# Patient Record
Sex: FEMALE | Race: WHITE | NOT HISPANIC OR LATINO | Employment: FULL TIME | URBAN - METROPOLITAN AREA
[De-identification: names, ages, dates, MRNs, and addresses within clinical notes are randomized per-mention and may not be internally consistent; named-entity substitution may affect disease eponyms.]

---

## 2017-02-17 ENCOUNTER — ALLSCRIPTS OFFICE VISIT (OUTPATIENT)
Dept: OTHER | Facility: OTHER | Age: 47
End: 2017-02-17

## 2017-02-17 DIAGNOSIS — Z13.29 ENCOUNTER FOR SCREENING FOR OTHER SUSPECTED ENDOCRINE DISORDER: ICD-10-CM

## 2017-02-17 DIAGNOSIS — Z12.39 ENCOUNTER FOR OTHER SCREENING FOR MALIGNANT NEOPLASM OF BREAST: ICD-10-CM

## 2017-02-17 DIAGNOSIS — Z13.220 ENCOUNTER FOR SCREENING FOR LIPOID DISORDERS: ICD-10-CM

## 2017-02-17 DIAGNOSIS — Z13.1 ENCOUNTER FOR SCREENING FOR DIABETES MELLITUS: ICD-10-CM

## 2017-02-17 DIAGNOSIS — E55.9 VITAMIN D DEFICIENCY: ICD-10-CM

## 2017-02-17 DIAGNOSIS — Z13.0 ENCOUNTER FOR SCREENING FOR DISEASES OF THE BLOOD AND BLOOD-FORMING ORGANS AND CERTAIN DISORDERS INVOLVING THE IMMUNE MECHANISM: ICD-10-CM

## 2017-03-20 ENCOUNTER — GENERIC CONVERSION - ENCOUNTER (OUTPATIENT)
Dept: OTHER | Facility: OTHER | Age: 47
End: 2017-03-20

## 2017-06-22 ENCOUNTER — ALLSCRIPTS OFFICE VISIT (OUTPATIENT)
Dept: OTHER | Facility: OTHER | Age: 47
End: 2017-06-22

## 2017-07-10 ENCOUNTER — OFFICE VISIT (OUTPATIENT)
Dept: URGENT CARE | Facility: CLINIC | Age: 47
End: 2017-07-10
Payer: COMMERCIAL

## 2017-07-10 ENCOUNTER — APPOINTMENT (OUTPATIENT)
Dept: RADIOLOGY | Facility: CLINIC | Age: 47
End: 2017-07-10
Payer: COMMERCIAL

## 2017-07-10 DIAGNOSIS — M25.512 PAIN IN LEFT SHOULDER: ICD-10-CM

## 2017-07-10 PROCEDURE — 99213 OFFICE O/P EST LOW 20 MIN: CPT

## 2017-07-10 PROCEDURE — 73030 X-RAY EXAM OF SHOULDER: CPT

## 2017-07-12 ENCOUNTER — ALLSCRIPTS OFFICE VISIT (OUTPATIENT)
Dept: OTHER | Facility: OTHER | Age: 47
End: 2017-07-12

## 2018-01-10 NOTE — RESULT NOTES
Verified Results  CT PELVIS W CONTRAST [id number] 08:11AM Mariam Counts     Test Name Result Flag Reference   CT PELVIS W CONTRAST (Report)     CT PELVIS WITH IV CONTRAST     INDICATION: Sacral pain, no trauma history  COMPARISON: None  TECHNIQUE: CT examination of the pelvis was performed  Examination was performed utilizing techniques to minimize radiation dose, including the use of dose reduction software  Axial, sagittal and coronal reformatted images were submitted for    interpretation  100 ml of Omnipaque 350 was injected intravenously  Enteric contrast was administered  FINDINGS:     VISUALIZED KIDNEYS/URETERS: No significant abnormality identified in the partially imaged kidneys  REPRODUCTIVE ORGANS: Unremarkable for patient's age  URINARY BLADDER: Unremarkable  APPENDIX: A normal appendix was visualized  VISUALIZED BOWEL: Unremarkable  ABDOMINOPELVIC CAVITY: No ascites or free intraperitoneal air  No lymphadenopathy  VISUALIZED VESSELS: Unremarkable for patient's age  ABDOMINOPELVIC WALL/INGUINAL REGIONS: Unremarkable  OSSEOUS STRUCTURES: Within the bony pelvis there is a nonexpansile uniform sclerotic 11 x 4 mm focus seen within the left iliac wing as well as a tiny rounded sclerotic focus in the left sacral brittany  Both have features which favor a benign etiology    such as a bone island  If there is clinical suspicion for metastatic disease, a radionuclide bone scan could be considered to assess for metabolic activity  There is no evidence for pelvic fracture demonstrated  However, there is subtle sclerosis and    overlying productive bone formation seen along the right L5 lamina which could indicate an old healed fracture or incomplete chronic or developmental spondylolysis  The sacrum and coccyx appear otherwise within normal limits  IMPRESSION:     No acute abnormality identified  Sclerotic foci within the pelvis as discussed above   Please see comment  Possible old right L5 spondylolysis  Signed by:   Westley Holt MD   2/1/16   100       Plan  Metastatic cancer to pelvis    · * NM BONE SCAN 3 PHASE; SIDE:Bilateral; Status:Need Information - Financial  Authorization;  Requested for:[id number];

## 2018-01-13 VITALS
BODY MASS INDEX: 20.82 KG/M2 | RESPIRATION RATE: 18 BRPM | DIASTOLIC BLOOD PRESSURE: 62 MMHG | SYSTOLIC BLOOD PRESSURE: 102 MMHG | TEMPERATURE: 98.3 F | HEART RATE: 76 BPM | WEIGHT: 140.56 LBS | HEIGHT: 69 IN
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
## 2018-01-14 VITALS
SYSTOLIC BLOOD PRESSURE: 110 MMHG | OXYGEN SATURATION: 97 % | BODY MASS INDEX: 20.08 KG/M2 | DIASTOLIC BLOOD PRESSURE: 68 MMHG | HEIGHT: 69 IN | TEMPERATURE: 96.9 F | WEIGHT: 135.56 LBS | RESPIRATION RATE: 16 BRPM | HEART RATE: 70 BPM

## 2018-01-15 ENCOUNTER — ALLSCRIPTS OFFICE VISIT (OUTPATIENT)
Dept: OTHER | Facility: OTHER | Age: 48
End: 2018-01-15

## 2018-01-15 DIAGNOSIS — Z13.0 ENCOUNTER FOR SCREENING FOR DISEASES OF THE BLOOD AND BLOOD-FORMING ORGANS AND CERTAIN DISORDERS INVOLVING THE IMMUNE MECHANISM: ICD-10-CM

## 2018-01-15 DIAGNOSIS — Z13.21 ENCOUNTER FOR SCREENING FOR NUTRITIONAL DISORDER: ICD-10-CM

## 2018-01-15 DIAGNOSIS — Z13.1 ENCOUNTER FOR SCREENING FOR DIABETES MELLITUS: ICD-10-CM

## 2018-01-15 DIAGNOSIS — Z13.29 ENCOUNTER FOR SCREENING FOR OTHER SUSPECTED ENDOCRINE DISORDER: ICD-10-CM

## 2018-01-15 DIAGNOSIS — Z13.220 ENCOUNTER FOR SCREENING FOR LIPOID DISORDERS: ICD-10-CM

## 2018-01-15 NOTE — RESULT NOTES
Verified Results  * XR SHOULDER 2+ VIEW LEFT [id number] 10:44AM Mildred Wilkinson Order Number: [id number]     Test Name Result Flag Reference   XR SHOULDER 2+ VW LEFT (Report)     LEFT SHOULDER     INDICATION: Left shoulder pain  Recent trauma  COMPARISON: None     VIEWS: AP, Grashey and transscapular lateral      IMAGES: 3     FINDINGS:     There is no acute fracture or dislocation  Bony cortex appears intact  No degenerative changes  No lytic or blastic lesions are seen  Dense calcification is noted in the soft tissues adjacent to the greater tuberosity of the proximal humerus  IMPRESSION:     No acute osseous abnormality  Evidence of calcific bursitis/tendinitis calcareous, left shoulder  Workstation performed: [id number]     Signed by:   Adrienne Adhikari MD   7/10/17
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
## 2018-01-22 VITALS
HEART RATE: 78 BPM | BODY MASS INDEX: 22.66 KG/M2 | HEIGHT: 69 IN | WEIGHT: 153 LBS | TEMPERATURE: 98.1 F | SYSTOLIC BLOOD PRESSURE: 108 MMHG | DIASTOLIC BLOOD PRESSURE: 62 MMHG | RESPIRATION RATE: 16 BRPM

## 2018-03-24 LAB
CHOLEST SERPL-MCNC: 168 MG/DL (ref 0–200)
HDLC SERPL-MCNC: 51 MG/DL (ref 40–60)
LDLC SERPL CALC-MCNC: 99.2 MG/DL (ref 75–193)
TRIGL SERPL-MCNC: 91 MG/DL (ref 44–166)
VLDL CHOLESTEROL (HISTORICAL): 18 MG/DL (ref 5–51)

## 2018-05-24 ENCOUNTER — OFFICE VISIT (OUTPATIENT)
Dept: INTERNAL MEDICINE CLINIC | Facility: CLINIC | Age: 48
End: 2018-05-24
Payer: COMMERCIAL

## 2018-05-24 VITALS
BODY MASS INDEX: 20.76 KG/M2 | HEART RATE: 60 BPM | WEIGHT: 140.2 LBS | RESPIRATION RATE: 16 BRPM | HEIGHT: 69 IN | SYSTOLIC BLOOD PRESSURE: 108 MMHG | TEMPERATURE: 97.5 F | DIASTOLIC BLOOD PRESSURE: 72 MMHG

## 2018-05-24 DIAGNOSIS — Z13.0 SCREENING FOR DEFICIENCY ANEMIA: ICD-10-CM

## 2018-05-24 DIAGNOSIS — G54.0 THORACIC OUTLET SYNDROME: ICD-10-CM

## 2018-05-24 DIAGNOSIS — Z13.29 SCREENING FOR HYPOTHYROIDISM: ICD-10-CM

## 2018-05-24 DIAGNOSIS — R07.82 INTERCOSTAL PAIN: Primary | ICD-10-CM

## 2018-05-24 DIAGNOSIS — Z13.220 SCREENING FOR HYPERLIPIDEMIA: ICD-10-CM

## 2018-05-24 DIAGNOSIS — Z13.1 SCREENING FOR DIABETES MELLITUS: ICD-10-CM

## 2018-05-24 LAB
ANION GAP SERPL CALCULATED.3IONS-SCNC: 13.1 MM/L
BUN SERPL-MCNC: 11 MG/DL (ref 7–25)
CALCIUM SERPL-MCNC: 9.5 MG/DL (ref 8.6–10.5)
CHLORIDE SERPL-SCNC: 100 MM/L (ref 98–107)
CO2 SERPL-SCNC: 28 MM/L (ref 21–31)
CREAT SERPL-MCNC: 0.75 MG/DL (ref 0.6–1.2)
EGFR (HISTORICAL): > 60 GFR
EGFR AFRICAN AMERICAN (HISTORICAL): > 60 GFR
GLUCOSE (HISTORICAL): 78 MG/DL (ref 65–99)
OSMOLALITY, SERUM (HISTORICAL): 272 MOSM (ref 262–291)
POTASSIUM SERPL-SCNC: 4.1 MM/L (ref 3.5–5.5)
SODIUM SERPL-SCNC: 137 MM/L (ref 134–143)

## 2018-05-24 PROCEDURE — 1036F TOBACCO NON-USER: CPT | Performed by: INTERNAL MEDICINE

## 2018-05-24 PROCEDURE — 3008F BODY MASS INDEX DOCD: CPT | Performed by: INTERNAL MEDICINE

## 2018-05-24 PROCEDURE — 99214 OFFICE O/P EST MOD 30 MIN: CPT | Performed by: INTERNAL MEDICINE

## 2018-05-24 RX ORDER — MULTIVITAMIN
TABLET ORAL DAILY
COMMUNITY

## 2018-05-24 RX ORDER — CYCLOBENZAPRINE HCL 10 MG
1 TABLET ORAL 3 TIMES DAILY PRN
COMMUNITY
Start: 2018-01-15 | End: 2018-05-24

## 2018-05-24 RX ORDER — DICLOFENAC SODIUM 75 MG/1
75 TABLET, DELAYED RELEASE ORAL 2 TIMES DAILY PRN
Qty: 60 TABLET | Refills: 1 | Status: SHIPPED | OUTPATIENT
Start: 2018-05-24 | End: 2018-07-16 | Stop reason: SDUPTHER

## 2018-05-24 RX ORDER — LANOLIN ALCOHOL/MO/W.PET/CERES
1 CREAM (GRAM) TOPICAL DAILY
COMMUNITY

## 2018-05-24 RX ORDER — DICLOFENAC SODIUM 75 MG/1
1 TABLET, DELAYED RELEASE ORAL 2 TIMES DAILY PRN
COMMUNITY
Start: 2018-01-15 | End: 2018-05-24

## 2018-05-24 RX ORDER — NORGESTIMATE AND ETHINYL ESTRADIOL 7DAYSX3 28
1 KIT ORAL
COMMUNITY
Start: 2018-01-15 | End: 2021-03-09

## 2018-05-24 NOTE — PROGRESS NOTES
Assessment/Plan:       Diagnoses and all orders for this visit:    Intercostal pain  -     Basic metabolic panel; Future    Thoracic outlet syndrome  -     CT chest w contrast; Future  -     diclofenac (VOLTAREN) 75 mg EC tablet; Take 1 tablet (75 mg total) by mouth 2 (two) times a day as needed (pain) for up to 30 days    Screening for deficiency anemia  -     CBC and differential    Screening for diabetes mellitus  -     Comprehensive metabolic panel    Screening for hyperlipidemia  -     Lipid Panel with Direct LDL reflex    Screening for hypothyroidism  -     TSH, 3rd generation with T4 reflex    Other orders  -     calcium citrate-vitamin D (CITRACAL+D) 315-200 MG-UNIT per tablet; Take by mouth  -     Discontinue: cyclobenzaprine (FLEXERIL) 10 mg tablet; Take 1 tablet by mouth 3 (three) times a day as needed  -     Discontinue: diclofenac (VOLTAREN) 75 mg EC tablet; Take 1 tablet by mouth 2 (two) times a day as needed  -     Multiple Vitamin tablet; Take by mouth daily  -     norgestimate-ethinyl estradiol (ORTHO TRI-CYCLEN,TRINESSA) 0 18/0 215/0 25 MG-35 MCG per tablet; Take 1 tablet by mouth        No problem-specific Assessment & Plan notes found for this encounter  We will followup on the CT of the chest and see if we can better define what is causing pain in her upper anterior chest wall  Subjective:      Patient ID: Shahab Del Castillo is a 53 y o  female  The patient is following up from her prior visit at which time the patient was noted to have issues with anterior chest pain below the right collar bone  She notes no injury to the area and states no injury to the area  The patient states that there are no other issues  She states that there are no other concerns and states the pain is only reproducible with reaching across her body with her right arm or flexing the arm above the level of her shoulder  She states the pain does not occur with cough, deep inspiration or bearing down    The patient states that she is able to run and it does not effect her  The patient has been experiencing this for almost 7 months  She has had chiropractic care that offers transient relief  She has used the Diclofenac that was prescribed in January with reasonable temporary effect  The following portions of the patient's history were reviewed and updated as appropriate:   She has no past medical history on file  ,   does not have any pertinent problems on file  ,   has a past surgical history that includes Dental surgery; Inguinal hernia repair; Arthrodesis; and Wrist surgery  ,  family history includes Diabetes in her mother; Heart disease in her father; Hypertension in her mother; Stroke in her father  ,   reports that she has never smoked  She has never used smokeless tobacco  She reports that she drinks alcohol  She reports that she does not use drugs  ,  has No Known Allergies     Current Outpatient Prescriptions   Medication Sig Dispense Refill    calcium citrate-vitamin D (CITRACAL+D) 315-200 MG-UNIT per tablet Take by mouth      Multiple Vitamin tablet Take by mouth daily      norgestimate-ethinyl estradiol (ORTHO TRI-CYCLEN,TRINESSA) 0 18/0 215/0 25 MG-35 MCG per tablet Take 1 tablet by mouth      diclofenac (VOLTAREN) 75 mg EC tablet Take 1 tablet (75 mg total) by mouth 2 (two) times a day as needed (pain) for up to 30 days 60 tablet 1     No current facility-administered medications for this visit  Review of Systems   Constitutional: Negative for chills, fatigue and fever  HENT: Negative for ear pain, postnasal drip, rhinorrhea, sinus pain and sinus pressure  Respiratory: Negative for cough, chest tightness and shortness of breath  Cardiovascular: Negative for chest pain and palpitations  Gastrointestinal: Negative for abdominal pain, constipation, diarrhea, nausea and vomiting  Genitourinary: Negative  Musculoskeletal: Negative for back pain and myalgias  Skin: Negative  Neurological: Negative  Psychiatric/Behavioral: Negative  Objective:  Vitals:    05/24/18 1150   BP: 108/72   BP Location: Left arm   Patient Position: Sitting   Cuff Size: Large   Pulse: 60   Resp: 16   Temp: 97 5 °F (36 4 °C)   Weight: 63 6 kg (140 lb 3 2 oz)   Height: 5' 9" (1 753 m)     Body mass index is 20 7 kg/m²  Physical Exam   Constitutional: She is oriented to person, place, and time  She appears well-developed and well-nourished  HENT:   Head: Normocephalic and atraumatic  Eyes: Conjunctivae and EOM are normal  Pupils are equal, round, and reactive to light  Neck: Normal range of motion  Neck supple  Cardiovascular: Normal rate, regular rhythm, normal heart sounds and intact distal pulses  Pulmonary/Chest: Effort normal and breath sounds normal    Abdominal: Soft  Musculoskeletal: She exhibits tenderness  Arms:  Neurological: She is alert and oriented to person, place, and time

## 2018-07-16 DIAGNOSIS — G54.0 THORACIC OUTLET SYNDROME: ICD-10-CM

## 2018-07-16 RX ORDER — DICLOFENAC SODIUM 75 MG/1
TABLET, DELAYED RELEASE ORAL
Qty: 60 TABLET | Refills: 1 | Status: SHIPPED | OUTPATIENT
Start: 2018-07-16 | End: 2021-03-09 | Stop reason: CLARIF

## 2019-03-20 ENCOUNTER — NURSE TRIAGE (OUTPATIENT)
Dept: PHYSICAL THERAPY | Facility: OTHER | Age: 49
End: 2019-03-20

## 2019-03-20 DIAGNOSIS — M54.50 ACUTE RIGHT-SIDED LOW BACK PAIN WITHOUT SCIATICA: Primary | ICD-10-CM

## 2019-03-20 NOTE — TELEPHONE ENCOUNTER
Background - Initial Assessment  Clinical complaint: Acute low back, right side, down her leg stopping at her knee  Date of onset: 2 months  Mechanism of injury: none    Previous Treatment - Previous Treatment  Previous evaluation: Orthopedic surgeon, pain mangement  Current provider: PCP  Diagnostics: None  Previous treatment: Physical therapy, epidural    Protocols used:  AMB COMPREHENSIVE SPINE PROGRAM PROTOCOL    Pt  c/o acute low back pain that is primarily on her right side that does travel to her leg,denies numbness or tingling  The pain does not travel to her arms at this time  Pt  did state that she has a long commute and at the end of that drive her back pain is worse  Pt  stated that she had two back surgeries for herniated discs in her back, and the last surgery was 6 years ago  Pt  does not f/u with a spine physician, since she has not had any further issues with her back  She has also had epidural injections in the past      Pt has no current imaging at this time  She did state that she is looking to do physical therapy since she has done this in the past, and is hoping this will elevates her low back pain  She is looking to avoid surgery  Pt  did refuse PM&R at this time and would like to have physical therapy at the [facility]  A referral was placed for physical therapy at the [facility], and Pt  was transferred to physical therapy , to  her evaluation appointment

## 2019-03-25 ENCOUNTER — EVALUATION (OUTPATIENT)
Dept: PHYSICAL THERAPY | Facility: CLINIC | Age: 49
End: 2019-03-25
Payer: COMMERCIAL

## 2019-03-25 VITALS — SYSTOLIC BLOOD PRESSURE: 110 MMHG | DIASTOLIC BLOOD PRESSURE: 78 MMHG | HEART RATE: 56 BPM | TEMPERATURE: 96.8 F

## 2019-03-25 DIAGNOSIS — M54.50 ACUTE RIGHT-SIDED LOW BACK PAIN WITHOUT SCIATICA: Primary | ICD-10-CM

## 2019-03-25 PROCEDURE — 97140 MANUAL THERAPY 1/> REGIONS: CPT | Performed by: PHYSICAL MEDICINE & REHABILITATION

## 2019-03-25 PROCEDURE — 97535 SELF CARE MNGMENT TRAINING: CPT | Performed by: PHYSICAL MEDICINE & REHABILITATION

## 2019-03-25 PROCEDURE — 97161 PT EVAL LOW COMPLEX 20 MIN: CPT | Performed by: PHYSICAL MEDICINE & REHABILITATION

## 2019-03-25 PROCEDURE — 97110 THERAPEUTIC EXERCISES: CPT | Performed by: PHYSICAL MEDICINE & REHABILITATION

## 2019-03-25 NOTE — PROGRESS NOTES
PT Evaluation     Today's date: 3/25/2019  Patient name: Disha Dominguez  : 1970  MRN: [id number]  Referring provider: Violette Chaparro MD  Dx:   Encounter Diagnosis     ICD-10-CM    1  Acute right-sided low back pain without sciatica M54 5                   Assessment  Assessment details: Disha Dominguez is a pleasant 53 y o  female who presents to [facility]PP[facility] through the [facility] with c/c of constant R sided LBP with intermittent pain into R LE to the knee  Reports her LBP and LE sx are worsened with prolonged sitting and repetitive bending activity  Notes her LBP is also aggravated by running and prolonged walking  PMHx is significant for similar past sx with resultant microdiscectomy and laminectomy procedures L/S in  and   She demonstrates very fearful and guarded AROM of her spine, which she notes she has limited since her surgeries for "fear of it going out again (her spine)"  The pt also demonstrates grossly poor posture with reduced postural awareness  She demonstrates + signs/sx suggestive of possible SIJ dysfunction and/or posterior derangement L/S  She responded fairly to initial tx this date  Extensive counseling and education was given regarding impacts of posture on the spine and spinal structures, proper postural alignment with verbal and hand on cues and demo, avoidance of prolonged or repetitive L/S flexion ROM/postures, use of extension in standing vs prone to neutralize and resolve effects of sustained flexion, and signs/sx of centralization vs peripheralization and when to stop or modify activities based on this  HEP was given for postural correction in sitting and use of prone vs standing L/S extension ROM frequently t/o the day as tolerated, pending sx response  Pt noted understanding without questions/concerns end of session   She will trial HEP over the next week and notify PT of her progress upcoming; she may be seen for one more visit/re-eval in 1-2 weeks to determine progress and need for further tx vs d/c  The patient's greatest concerns are the pain she is experiencing, wanting to avoid painkillers, wanting to avoid surgery, fear of not being able to keep active and future ill health (and wanting to prevent it)  No further referral appears necessary at this time based upon examination results  Primary movement impairment diagnosis of lumbar posterior derangement vs SIJ dysfunction resulting in pathoanatomical symptoms of Acute right-sided low back pain without sciatica  (primary encounter diagnosis) and limiting her ability to drive, exercise or recreation, perform household chores, sit and walk  Impairments include:  1) repeated flexion - addressing with mobs and repeated extension mobility exercises   2) poor lifting mechanics - addressing with functional retraining  3) poor lumbopelvic movement coordination - addressing with neuromotor retraining    Etiologic factors include repetitive poor body mechanics, repetitive poor lifting mechanics and poor ergonomics  Impairments: abnormal coordination, abnormal muscle firing, abnormal muscle tone, abnormal or restricted ROM, abnormal movement, activity intolerance, difficulty understanding, impaired physical strength, lacks appropriate home exercise program, pain with function, poor posture  and poor body mechanics    Symptom irritability: moderateUnderstanding of Dx/Px/POC: good   Prognosis: good  Prognosis details: Positive prognostic indicators include positive attitude toward recovery, good understanding of diagnosis and treatment plan options and absence of observed red flags  Negative prognostic indicators include pt apprehension regarding L/S ROM  Goals  STGs to be achieved in 2-4 weeks:    1  Pt will report reduced L/S pain levels "at worst" by at least 3-4 points (0-10 scale) in order to allow improved tolerance for functional mobility and reduced reliance on medication or modalities for pain relief     2  Pt will demonstrate improved AROM of flexion and extension grossly by at least 25% in order to reduce pt difficulty with functional mobility and transfers  3  Pt to report resolution of radicular sx into R LE  to demonstrate centralization of sx and overall progress with treatment  4  Pt will report overall improved tolerance for running with pt reporting ability to run at least 3 miles without increased pain/sx  5 Patient will be able to sit without limitation due to pain  LTGs to be achieved in 4-6 weeks:    1  Pt will be independent with HEP, demonstrating proper technique with exercises and understanding of self progression of program without need for cueing or assistance  2  Pt will report minimized pain levels with at least 75% reduction in pain since [facility]  3  Pt will demonstrate WFL AROM and strength of L/S grossly without increased pain  5  Pt will report return to running and weight training without limitations  6  FOTO will reflect score at discharge that is greater than or equal to predicted level  Plan  Patient would benefit from: skilled physical therapy  Planned therapy interventions: abdominal trunk stabilization, activity modification, joint mobilization, manual therapy, motor coordination training, neuromuscular re-education, patient education, self care, therapeutic activities, therapeutic exercise, home exercise program and behavior modification  Frequency: 1x week  Duration in visits: 2  Duration in weeks: 4  Treatment plan discussed with: patient        Subjective Evaluation    History of Present Illness  Mechanism of injury: Pt reports PMHx significant for two L/S surgeries; 12/2001 underwent microdisectomy and laminectomy at L4/L5 and L5/S1; and in 2013 underwent the same procedure again at L5/S1 per pt  Notes her sx completely resolved after each surgery, although she has been left with constant numbness R lateral border of her foot only   Notes she was also initially had R calf weakness which she reports has resolved  Pt notes present sx began about 2 months ago  Was active with running and training for a half marathon  Was sitting with legs crossed with laptop on her knees at work; noted upon getting up, she had elevated R sided LBP that radiated into her R posterior thigh to her knee  Notes she was busy with house work the next day, bending and cleaning; also ran 10miles; notes sx persisted  Notes she took a few days off from running and then reduced her mileage; Noted minimal changes in sx  Sx gradually improved once she "quit running" end of Feb after the half marathon  Noted pain across her L/S was worse when first getting OOB; gradually reduced with activity t/o the morning  However, Since she has stopped running, this has not occurred  Notes she began to see the chiropractor about a month ago; tx with US, massage, and stretching with some relief per pt  Works full time as a teacher; has an hour drive each way  Sx are worse upon getting out of the car  Gradually has begun walking 3-4 miles 2-3x/week  Pain is worse about 2-3 miles in with R sided LBP, however improves if she keeps going  Pt has not seen her MD in the past year  Feels her sx are similar to what she had in the past however not as severe  Notes she also got a new mattress Saturday; notes minimal sx today and that today is a "good day" for her  At this point, pt notes sx are random  No reduction in pain with ice, aleve, or advil  R sided LBP is constant whereas R LE sx are intermittent and only to the knee  Notes with sitting ~1 hr she gets R sided LBP and pain into R buttocks and posterior thigh; feels LE sx seem to occur only with sitting  Constant n/t lateral R foot since 2nd surgery as noted; no change to date per pt  No other n/t  Denies any LE weakness, changes in gait, etc at this time  Notes she has stopped weight training and running; and would like to resume as soon as possible   Pt notes fear of bending and lifting since her surgeries  In general, avoids end ranges of spine movement, especially in combination  Recurrent probem    Pain  Current pain ratin  At best pain ratin  At worst pain ratin  Quality: dull ache, radiating and sharp  Aggravating factors: sitting, running and walking  Progression: improved    Social Support  Steps to enter house: yes  Stairs in house: no   L[facility] in: [facility]  Lives with: sister  Employment status: working (teacher)  Hand dominance: right  Exercise history: Used to enjoy running and strength training, presently fearful to resume       Diagnostic Tests  No diagnostic tests performed  Treatments  Current treatment: chiropractic and physical therapy  Patient Goals  Patient goals for therapy: decreased pain, increased motion, return to sport/leisure activities and independence with ADLs/IADLs  Patient's goals regarding treatment: "to not have any pain" and to "return to strength training and running"        Objective     Concurrent Complaints  Positive for disturbed sleep   Negative for night pain, bladder dysfunction, bowel dysfunction, saddle (S4) numbness and history of trauma    Neurological Testing     Sensation     Lumbar   Left   Intact: light touch    Right   Intact: light touch  Diminished: light touch    Reflexes   Left   Patellar (L4): normal (2+)  Achilles (S1): normal (2+)    Right   Patellar (L4): normal (2+)  Achilles (S1): normal (2+)    Additional Neurological Details  Demonstrates reduced sensation to light touch lateral border of R foot  No other sensory changes noted  Will monitor     Active Range of Motion     Lumbar   Flexion:  Restriction level: moderate  Extension:  with pain Restriction level: moderate  Left lateral flexion:  Restriction level: moderate  Right lateral flexion:  Restriction level: minimal  Left rotation:  Restriction level: moderate  Right rotation:  Restriction level: moderate    Passive Range of Motion     Lumbar   Left lateral flexion:  Restriction level: moderate  Right lateral flexion:  Restriction level: minimal  Left rotation:  Restriction level: moderate  Right rotation:  Restriction level: moderate  Mechanical Assessment    Cervical      Thoracic      Lumbar    Standing flexion: repeated movements   Pain location:no change  Pain level: increased  Pain increased, no worse  Standing extension: repeated movements  Pain location: no change  Pain level: increased  Pain increased, slightly worse (" 5/10" worse)  Lying extension: repeated movements  Pain location: no change  Pain intensity: better  Pain slightly reduced at rest following repeated L/S ext in prone (back to baseline, no change in location)      Strength/Myotome Testing     Lumbar   Left   Heel walk: normal  Toe walk: normal    Right   Heel walk: normal  Toe walk: normal    Left Hip   Planes of Motion   Flexion: 4  Abduction: 4  Adduction: 4+    Right Hip   Planes of Motion   Flexion: 4  Abduction: 4  Adduction: 4+    Left Knee   Flexion: 4+  Extension: 4+    Right Knee   Flexion: 4+  Extension: 4+    Left Ankle/Foot   Dorsiflexion: 4+  Plantar flexion: 5  Great toe flexion: 4+  Great toe extension: 4+    Right Ankle/Foot   Dorsiflexion: 4+  Plantar flexion: 5  Great toe flexion: 4+  Great toe extension: 4+    Additional Strength Details  No pain/sx with MMT screen      Tests     Lumbar   Positive sacroiliac distraction   Negative SIJ compression  Left   Negative crossed SLR and passive SLR  Right   Negative crossed SLR and passive SLR  Right Hip   Positive long sit       Additional Tests Details  + supine to sit test for R posterior/L anterior rotation; corrected following MET technique with negative test following correction; will monitor         General Comments:      Lumbar Comments  Pain returned to baseline following tx/assessment  No radicular sx produced  Improved AROM L/S extension noted end of tx  Will cont to assess           Precautions: Hx L/S laminectomy and microdiskectomy x 2       Re-eval Date: 4/22    Date 3/25       Visit Count 1       FOTO 3/25       Pain In 1/10       Pain Out 1/10           Daily Treatment Diary     Manual  3/25        8'                                       Muscle Energy Technique for right posterior SI rotation patient in hook lying to patient tolerance  Exercise Diary  3/25       Seated posture correction training with cues/demo 10' total       Repeated L/S ext in standing 2x10        Prone in extension 3'       Prone extension in lying 10x                                                                                                                                             Modalities  3/25                                 3/25 - HEP was issued and reviewed this date for above noted exercises  Extensive pt education was provided as noted in assessment section  Pt demonstrated understanding without incident and without questions/concerns  Will continue to update upcoming

## 2019-05-16 NOTE — PROGRESS NOTES
Terri will be discharged from outpatient physical therapy care due to expiration of plan of care  Last attended tx session was on 3/25 ;did not return to therapy after this date  No objective measures updated, Terri is not present at time of discharge

## 2021-03-09 ENCOUNTER — TELEPHONE (OUTPATIENT)
Dept: ADMINISTRATIVE | Facility: OTHER | Age: 51
End: 2021-03-09

## 2021-03-09 ENCOUNTER — OFFICE VISIT (OUTPATIENT)
Dept: INTERNAL MEDICINE CLINIC | Facility: CLINIC | Age: 51
End: 2021-03-09
Payer: COMMERCIAL

## 2021-03-09 VITALS
TEMPERATURE: 97.6 F | BODY MASS INDEX: 21.12 KG/M2 | WEIGHT: 142.6 LBS | RESPIRATION RATE: 14 BRPM | OXYGEN SATURATION: 92 % | DIASTOLIC BLOOD PRESSURE: 78 MMHG | SYSTOLIC BLOOD PRESSURE: 108 MMHG | HEIGHT: 69 IN | HEART RATE: 70 BPM

## 2021-03-09 DIAGNOSIS — Z12.11 SCREEN FOR COLON CANCER: Primary | ICD-10-CM

## 2021-03-09 DIAGNOSIS — Z13.220 SCREENING FOR HYPERCHOLESTEROLEMIA: ICD-10-CM

## 2021-03-09 PROCEDURE — 99396 PREV VISIT EST AGE 40-64: CPT | Performed by: INTERNAL MEDICINE

## 2021-03-09 NOTE — PROGRESS NOTES
Assessment/Plan:    Problem List Items Addressed This Visit     None      Visit Diagnoses     Screen for colon cancer    -  Primary    Relevant Orders    Ambulatory referral for colonoscopy    Screening for hypercholesterolemia        Relevant Orders    Lipid Panel with Direct LDL reflex           Diagnoses and all orders for this visit:    Screen for colon cancer  -     Ambulatory referral for colonoscopy; Future    Screening for hypercholesterolemia  -     Lipid Panel with Direct LDL reflex; Future    Other orders  -     Cancel: Ambulatory referral to Gastroenterology; Future        No problem-specific Assessment & Plan notes found for this encounter  Subjective:      Patient ID: Daisy Kerr is a 53 y o  female  Discussed Shingrix vaccine and we will do this when able  The patient needs a screening colonoscopy and we discussed this  The patient is interested in the Lipid screening      The following portions of the patient's history were reviewed and updated as appropriate:   She has no past medical history on file  ,  does not have any pertinent problems on file  ,   has a past surgical history that includes Dental surgery; Inguinal hernia repair; Arthrodesis; and Wrist surgery  ,  family history includes Diabetes in her mother; Heart disease in her father; Hypertension in her mother; Stroke in her father  ,   reports that she has never smoked  She has never used smokeless tobacco  She reports current alcohol use  She reports that she does not use drugs  ,  has No Known Allergies     Current Outpatient Medications   Medication Sig Dispense Refill    calcium citrate-vitamin D (CITRACAL+D) 315-200 MG-UNIT per tablet Take by mouth      Multiple Vitamin tablet Take by mouth daily       No current facility-administered medications for this visit  Review of Systems   Constitutional: Negative for chills, fatigue and fever  HENT: Negative      Respiratory: Negative for cough, chest tightness and shortness of breath  Cardiovascular: Negative for chest pain, palpitations and leg swelling  Gastrointestinal: Negative for abdominal pain, constipation, diarrhea, nausea and vomiting  Genitourinary: Negative  Musculoskeletal: Negative for arthralgias, back pain and myalgias  Skin: Negative  Neurological: Negative  Psychiatric/Behavioral: Negative  Objective:  Vitals:    03/09/21 0913   BP: 108/78   BP Location: Left arm   Patient Position: Sitting   Cuff Size: Standard   Pulse: 70   Resp: 14   Temp: 97 6 °F (36 4 °C)   SpO2: 92%   Weight: 64 7 kg (142 lb 9 6 oz)   Height: 5' 9" (1 753 m)     Body mass index is 21 06 kg/m²  Physical Exam  Vitals signs and nursing note reviewed  Constitutional:       Appearance: She is well-developed  HENT:      Head: Normocephalic and atraumatic  Eyes:      Pupils: Pupils are equal, round, and reactive to light  Neck:      Musculoskeletal: Normal range of motion and neck supple  Cardiovascular:      Rate and Rhythm: Normal rate and regular rhythm  Heart sounds: Normal heart sounds  No murmur  Pulmonary:      Effort: Pulmonary effort is normal  No respiratory distress  Breath sounds: Normal breath sounds  No wheezing  Abdominal:      General: Bowel sounds are normal       Palpations: Abdomen is soft  Tenderness: There is no abdominal tenderness  Musculoskeletal: Normal range of motion  Skin:     General: Skin is warm and dry  Neurological:      Mental Status: She is alert and oriented to person, place, and time            PHQ-9 Depression Screening    PHQ-9:   Frequency of the following problems over the past two weeks:      Little interest or pleasure in doing things: 0 - not at all  Feeling down, depressed, or hopeless: 0 - not at all  PHQ-2 Score: 0

## 2021-03-09 NOTE — TELEPHONE ENCOUNTER
----- Message from Holly Marie sent at 3/9/2021  7:29 AM EST -----  03/09/21 7:29 AM    Hello, our patient Terri Esteban has had Mammogram completed/performed  Please assist in updating the patient chart by pulling the Care Everywhere (CE) document  The date of service is 10/16/2020       Thank you,  Angy Suggs PG [facility]

## 2021-03-09 NOTE — TELEPHONE ENCOUNTER
----- Message from Fernando Johnson sent at 3/9/2021  7:30 AM EST -----  03/09/21 7:30 AM    Hello, our patient Terri Esteban has had Pap Smear (HPV) aka Cervical Cancer Screening completed/performed  Please assist in updating the patient chart by pulling the Care Everywhere (CE) document  The date of service is 2/26/2021       Thank you,  Angy Suggs PG [facility]

## 2021-03-09 NOTE — TELEPHONE ENCOUNTER
Upon review of the In Basket request we were able to locate, review, and update the patient chart as requested for Mammogram and Pap Smear (HPV) aka Cervical Cancer Screening  Any additional questions or concerns should be emailed to the Practice Liaisons via [email or web address] email, please do not reply via In Basket      Thank you  Jena Yu

## 2021-03-31 DIAGNOSIS — Z23 ENCOUNTER FOR IMMUNIZATION: ICD-10-CM

## 2021-05-15 ENCOUNTER — APPOINTMENT (OUTPATIENT)
Dept: LAB | Facility: HOSPITAL | Age: 51
End: 2021-05-15
Payer: COMMERCIAL

## 2021-05-15 DIAGNOSIS — Z13.220 SCREENING FOR HYPERCHOLESTEROLEMIA: ICD-10-CM

## 2021-05-15 LAB
CHOLEST SERPL-MCNC: 171 MG/DL (ref 0–200)
HDLC SERPL-MCNC: 49 MG/DL
LDLC SERPL CALC-MCNC: 106 MG/DL (ref 0–100)
TRIGL SERPL-MCNC: 80 MG/DL (ref 44–166)

## 2021-05-15 PROCEDURE — 36415 COLL VENOUS BLD VENIPUNCTURE: CPT

## 2021-05-15 PROCEDURE — 80061 LIPID PANEL: CPT

## 2022-01-04 ENCOUNTER — OFFICE VISIT (OUTPATIENT)
Dept: PHYSICAL THERAPY | Facility: MEDICAL CENTER | Age: 52
End: 2022-01-04
Payer: COMMERCIAL

## 2022-01-04 DIAGNOSIS — G50.1 ATYPICAL FACIAL PAIN: ICD-10-CM

## 2022-01-04 DIAGNOSIS — R51.9 CRANIOFACIAL PAIN: ICD-10-CM

## 2022-01-04 DIAGNOSIS — G51.8 CRANIOFACIAL PAIN SYNDROME: Primary | ICD-10-CM

## 2022-01-04 PROCEDURE — 97162 PT EVAL MOD COMPLEX 30 MIN: CPT | Performed by: PHYSICAL THERAPIST

## 2022-01-04 PROCEDURE — 97112 NEUROMUSCULAR REEDUCATION: CPT | Performed by: PHYSICAL THERAPIST

## 2022-01-04 NOTE — PROGRESS NOTES
PT Evaluation  and PT Discharge    Today's date: 2022  Patient name: Susie Espana  : 1970  MRN: [id number]  Referring provider: Stella Prajapati PT  Dx:   Encounter Diagnosis     ICD-10-CM    1  Craniofacial pain syndrome  G51 8    2  Craniofacial pain  R51 9    3  Atypical facial pain  G50 1                   Assessment  Assessment details: Terri Esteban is a pleasant 49 y o  female who presents with R craniofacial pain that began 1 week prior to Thanksgiving and did not improve despite NSAIDs and corticosteroids until the last 2 weeks  The primary movement problem is poor TMJ movement coordination resulting in R anterior disc displacement with reduction and limiting her ability to chew, eat and yawn  Patient would benefit from further consultation for possible night splint due to significant signs of nocturnal bruxism such as cusp wear and scalloped tongue  I expect she will continue to improve and will improve faster with addition of a few exercises to facilitate healing  Furthermore, with the addition of a few other exercises, she will have the tools to address any future recurrences as needed  The patient's greatest concerns are that this problem will become recurrent  Problem List:  1) poor TMJ movement coordination - addressing with neuromotor retraining   2) poor cervicothoracic movement coordination - addressing with functional retraining  3) poor postural control - addressing with neuromotor retraining     Etiology is likely a combination of biting hard on a food object in combination of stressors and nocturnal bruxism  Prognosis details: Positive prognostic indicators include positive attitude toward recovery  Goals  Patient will be independent with home exercise program  - MET  Patient will be able to manage symptoms independently   - MET    Plan  Duration in visits: 1        Subjective Evaluation    History of Present Illness  Mechanism of injury: Insidious about 1 week prior to Thanksgiving while eating but did not go away despite trial of NSAIDs and also corticosteroids  She also tried ice and self-massage with no benefit  Eating and opening her mouth was extremely painful and felt considerable crepitus and clicking along with the pain  Over the past 1-2 weeks, it has improved considerably  Pain  At worst pain rating: 10    Patient Goals  Patient goals for therapy: decreased pain, increased motion and independence with ADLs/IADLs          Objective     Static Posture     Head  Forward  Shoulders  Rounded  Postural Observations  Seated posture: poor  Standing posture: fair        Palpation   Left   Hypertonic in the scalenes, sternocleidomastoid, upper trapezius, masseter and lateral pterygoid  Tenderness of the lateral pterygoid  Right   Hypertonic in the scalenes, sternocleidomastoid, upper trapezius, masseter and lateral pterygoid       Neurological Testing     Sensation   Cervical/Thoracic   Left   Intact: light touch    Right   Intact: light touch    Reflexes   Left   Biceps (C5/C6): normal (2+)  Elizabeth's reflex: negative    Right   Biceps (C5/C6): normal (2+)  Elizabeth's reflex: negative    Active Range of Motion   Cervical/Thoracic Spine       Cervical    Flexion:  WFL  Extension:  Restriction level: minimal  Left lateral flexion:  WFL  Right lateral flexion:  Restriction level moderate  Left rotation:  WFL  Right rotation:  [facility]  Left Shoulder   Normal active range of motion    Right Shoulder   Normal active range of motion    Left Elbow   Normal active range of motion    Right Elbow   Normal active range of motion    Left Wrist   Normal active range of motion    Right Wrist   Normal active range of motion    Joint Play   Joints within functional limits: C1, C2, C3 and C4     Hypomobile: C5, C6, C7 and T1     Strength/Myotome Testing   Cervical Spine     Left   Normal strength    Right   Normal strength    Tests   Cervical   Positive craniocervical flexion test     Left   Negative Spurling's Test A  Right   Negative Spurling's Test A  Ambulation   Weight-Bearing Status   Weight-Bearing Status (Left): full weight bearing   Weight-Bearing Status (Right): full weight-bearing      Observational Gait   Gait: within functional limits   TMJ   Jaw observations: facial symmetry within normal limits  Occlusion class: class I (normal)  Patient does not have a  cleft palate  Scalloping of tongue: yes  Cusp wear: yes  Jaw trauma: no  Retrognathia: yes  Joint sounds left: normal  Joint sounds right: popping  Lateral bite test, Left: pain on right  Lateral bite test, right: no pain  ROM: pain with movement  Opening (mm): 40 and right deviation   Lateral excursion, left (mm): 7  Lateral excursion, right (mm)t: 8              Precautions: none    Date: 1/4      Manual                                          Neuromuscular Re-education       TMJ controlled opening SK      TMJ lateral excursion SK             TMJ relaxed position SK      Cervical retraction SK      Yawn blocking SK                    Therapeutic Exercise                                   Therapeutic Activities                     Gait Training                     Modalities                     Access Code: [email or web address]  URL: [email or web address]

## 2022-03-11 ENCOUNTER — TELEPHONE (OUTPATIENT)
Dept: ADMINISTRATIVE | Facility: OTHER | Age: 52
End: 2022-03-11

## 2022-03-11 ENCOUNTER — OFFICE VISIT (OUTPATIENT)
Dept: INTERNAL MEDICINE CLINIC | Facility: CLINIC | Age: 52
End: 2022-03-11
Payer: COMMERCIAL

## 2022-03-11 VITALS
BODY MASS INDEX: 20.65 KG/M2 | RESPIRATION RATE: 12 BRPM | SYSTOLIC BLOOD PRESSURE: 116 MMHG | DIASTOLIC BLOOD PRESSURE: 74 MMHG | TEMPERATURE: 97.5 F | OXYGEN SATURATION: 100 % | WEIGHT: 139.4 LBS | HEIGHT: 69 IN | HEART RATE: 67 BPM

## 2022-03-11 DIAGNOSIS — Z13.0 SCREENING, ANEMIA, DEFICIENCY, IRON: ICD-10-CM

## 2022-03-11 DIAGNOSIS — Z82.49 FAMILY HISTORY OF EARLY CAD: ICD-10-CM

## 2022-03-11 DIAGNOSIS — E78.00 HYPERCHOLESTEROLEMIA: ICD-10-CM

## 2022-03-11 DIAGNOSIS — Z13.1 SCREENING FOR DIABETES MELLITUS: Primary | ICD-10-CM

## 2022-03-11 DIAGNOSIS — Z13.220 SCREENING FOR HYPERCHOLESTEROLEMIA: ICD-10-CM

## 2022-03-11 PROCEDURE — 3725F SCREEN DEPRESSION PERFORMED: CPT | Performed by: INTERNAL MEDICINE

## 2022-03-11 PROCEDURE — 3008F BODY MASS INDEX DOCD: CPT | Performed by: INTERNAL MEDICINE

## 2022-03-11 PROCEDURE — 99214 OFFICE O/P EST MOD 30 MIN: CPT | Performed by: INTERNAL MEDICINE

## 2022-03-11 PROCEDURE — 1036F TOBACCO NON-USER: CPT | Performed by: INTERNAL MEDICINE

## 2022-03-11 NOTE — PROGRESS NOTES
Assessment/Plan:    Problem List Items Addressed This Visit     None      Visit Diagnoses     Screening for diabetes mellitus    -  Primary    Relevant Orders    Comprehensive metabolic panel    Screening for hypercholesterolemia        Relevant Orders    Lipid Panel with Direct LDL reflex    Screening, anemia, deficiency, iron        Relevant Orders    CBC and differential    Family history of early CAD        Relevant Orders    CT coronary calcium score    Hypercholesterolemia        Relevant Orders    CT coronary calcium score           Diagnoses and all orders for this visit:    Screening for diabetes mellitus  -     Comprehensive metabolic panel; Future    Screening for hypercholesterolemia  -     Lipid Panel with Direct LDL reflex; Future    Screening, anemia, deficiency, iron  -     CBC and differential; Future    Family history of early CAD  -     CT coronary calcium score; Future    Hypercholesterolemia  -     CT coronary calcium score; Future        No problem-specific Assessment & Plan notes found for this encounter  Subjective:      Patient ID: Max Hastings is a 49 y o  female  The patient was seen and examined and noted to have issues with elevated cholesterol on prior labs and notes a family history of CAD  The patient is interested in further risk assessment of personal CAD risk  There are no other issues  We discussed a low cholesterol diet  The following portions of the patient's history were reviewed and updated as appropriate:   She has no past medical history on file  ,  does not have any pertinent problems on file  ,   has a past surgical history that includes Dental surgery; Inguinal hernia repair; Arthrodesis; and Wrist surgery  ,  family history includes Diabetes in her mother; Heart disease in her father; Hypertension in her mother; Stroke in her father  ,   reports that she has never smoked  She has never used smokeless tobacco  She reports current alcohol use   She reports that she does not use drugs  ,  has No Known Allergies     Current Outpatient Medications   Medication Sig Dispense Refill    calcium citrate-vitamin D (CITRACAL+D) 315-200 MG-UNIT per tablet Take by mouth      Multiple Vitamin tablet Take by mouth daily       No current facility-administered medications for this visit  Review of Systems   Constitutional: Negative for chills, fatigue and fever  HENT: Negative  Respiratory: Negative for cough, chest tightness and shortness of breath  Cardiovascular: Negative for chest pain, palpitations and leg swelling  Gastrointestinal: Negative for abdominal pain, constipation, diarrhea, nausea and vomiting  Genitourinary: Negative  Musculoskeletal: Negative for arthralgias, back pain and myalgias  Skin: Negative  Neurological: Negative  Psychiatric/Behavioral: Negative  Objective:  Vitals:    03/11/22 0701   BP: 116/74   BP Location: Left arm   Patient Position: Sitting   Cuff Size: Standard   Pulse: 67   Resp: 12   Temp: 97 5 °F (36 4 °C)   SpO2: 100%   Weight: 63 2 kg (139 lb 6 4 oz)   Height: 5' 9" (1 753 m)     Body mass index is 20 59 kg/m²  Physical Exam  Vitals and nursing note reviewed  Constitutional:       Appearance: She is well-developed  HENT:      Head: Normocephalic and atraumatic  Eyes:      Pupils: Pupils are equal, round, and reactive to light  Cardiovascular:      Rate and Rhythm: Normal rate and regular rhythm  Heart sounds: Normal heart sounds  No murmur heard  Pulmonary:      Effort: Pulmonary effort is normal  No respiratory distress  Breath sounds: Normal breath sounds  No wheezing  Abdominal:      General: Bowel sounds are normal       Palpations: Abdomen is soft  Tenderness: There is no abdominal tenderness  Musculoskeletal:         General: Normal range of motion  Cervical back: Normal range of motion and neck supple  Skin:     General: Skin is warm and dry     Neurological: Mental Status: She is alert and oriented to person, place, and time            PHQ-2/9 Depression Screening    Little interest or pleasure in doing things: 0 - not at all  Feeling down, depressed, or hopeless: 0 - not at all  PHQ-2 Score: 0  PHQ-2 Interpretation: Negative depression screen

## 2022-03-11 NOTE — TELEPHONE ENCOUNTER
----- Message from Jocelyn Swift sent at 3/10/2022  8:20 AM EST -----  03/10/22 8:20 AM    Hello, our patient Terri Estbean has had Mammogram completed/performed  Please assist in updating the patient chart by pulling the Care Everywhere (CE) document  The date of service is 12/8/2021       Thank you,  Angy Suggs  [facility]

## 2022-05-13 ENCOUNTER — HOSPITAL ENCOUNTER (OUTPATIENT)
Dept: CT IMAGING | Facility: CLINIC | Age: 52
Discharge: HOME | End: 2022-05-13
Payer: COMMERCIAL

## 2022-05-13 DIAGNOSIS — E78.00 HYPERCHOLESTEROLEMIA: ICD-10-CM

## 2022-05-13 DIAGNOSIS — Z82.49 FAMILY HISTORY OF EARLY CAD: ICD-10-CM

## 2022-05-13 PROCEDURE — 75571 CT HRT W/O DYE W/CA TEST: CPT

## 2022-05-13 PROCEDURE — G1004 CDSM NDSC: HCPCS

## 2022-05-27 ENCOUNTER — APPOINTMENT (OUTPATIENT)
Dept: LAB | Facility: CLINIC | Age: 52
End: 2022-05-27
Payer: COMMERCIAL

## 2022-05-27 DIAGNOSIS — Z13.220 SCREENING FOR HYPERCHOLESTEROLEMIA: ICD-10-CM

## 2022-05-27 DIAGNOSIS — Z13.0 SCREENING, ANEMIA, DEFICIENCY, IRON: ICD-10-CM

## 2022-05-27 DIAGNOSIS — Z13.1 SCREENING FOR DIABETES MELLITUS: ICD-10-CM

## 2022-05-27 LAB
ALBUMIN SERPL BCP-MCNC: 3.8 G/DL (ref 3.5–5)
ALP SERPL-CCNC: 42 U/L (ref 46–116)
ALT SERPL W P-5'-P-CCNC: 17 U/L (ref 12–78)
ANION GAP SERPL CALCULATED.3IONS-SCNC: 2 MMOL/L (ref 4–13)
AST SERPL W P-5'-P-CCNC: 13 U/L (ref 5–45)
BASOPHILS # BLD AUTO: 0.02 THOUSANDS/ΜL (ref 0–0.1)
BASOPHILS NFR BLD AUTO: 0 % (ref 0–1)
BILIRUB SERPL-MCNC: 0.57 MG/DL (ref 0.2–1)
BUN SERPL-MCNC: 10 MG/DL (ref 5–25)
CALCIUM SERPL-MCNC: 9.5 MG/DL (ref 8.3–10.1)
CHLORIDE SERPL-SCNC: 107 MMOL/L (ref 100–108)
CHOLEST SERPL-MCNC: 175 MG/DL
CO2 SERPL-SCNC: 30 MMOL/L (ref 21–32)
CREAT SERPL-MCNC: 0.65 MG/DL (ref 0.6–1.3)
EOSINOPHIL # BLD AUTO: 0.05 THOUSAND/ΜL (ref 0–0.61)
EOSINOPHIL NFR BLD AUTO: 1 % (ref 0–6)
ERYTHROCYTE [DISTWIDTH] IN BLOOD BY AUTOMATED COUNT: 13.2 % (ref 11.6–15.1)
GFR SERPL CREATININE-BSD FRML MDRD: 103 ML/MIN/1.73SQ M
GLUCOSE P FAST SERPL-MCNC: 84 MG/DL (ref 65–99)
HCT VFR BLD AUTO: 40.9 % (ref 34.8–46.1)
HDLC SERPL-MCNC: 54 MG/DL
HGB BLD-MCNC: 13 G/DL (ref 11.5–15.4)
IMM GRANULOCYTES # BLD AUTO: 0 THOUSAND/UL (ref 0–0.2)
IMM GRANULOCYTES NFR BLD AUTO: 0 % (ref 0–2)
LDLC SERPL CALC-MCNC: 110 MG/DL (ref 0–100)
LYMPHOCYTES # BLD AUTO: 1.12 THOUSANDS/ΜL (ref 0.6–4.47)
LYMPHOCYTES NFR BLD AUTO: 25 % (ref 14–44)
MCH RBC QN AUTO: 29.8 PG (ref 26.8–34.3)
MCHC RBC AUTO-ENTMCNC: 31.8 G/DL (ref 31.4–37.4)
MCV RBC AUTO: 94 FL (ref 82–98)
MONOCYTES # BLD AUTO: 0.44 THOUSAND/ΜL (ref 0.17–1.22)
MONOCYTES NFR BLD AUTO: 10 % (ref 4–12)
NEUTROPHILS # BLD AUTO: 2.94 THOUSANDS/ΜL (ref 1.85–7.62)
NEUTS SEG NFR BLD AUTO: 64 % (ref 43–75)
NRBC BLD AUTO-RTO: 0 /100 WBCS
PLATELET # BLD AUTO: 257 THOUSANDS/UL (ref 149–390)
PMV BLD AUTO: 11.3 FL (ref 8.9–12.7)
POTASSIUM SERPL-SCNC: 3.9 MMOL/L (ref 3.5–5.3)
PROT SERPL-MCNC: 7.3 G/DL (ref 6.4–8.2)
RBC # BLD AUTO: 4.36 MILLION/UL (ref 3.81–5.12)
SODIUM SERPL-SCNC: 139 MMOL/L (ref 136–145)
TRIGL SERPL-MCNC: 53 MG/DL
WBC # BLD AUTO: 4.57 THOUSAND/UL (ref 4.31–10.16)

## 2022-05-27 PROCEDURE — 36415 COLL VENOUS BLD VENIPUNCTURE: CPT

## 2022-05-27 PROCEDURE — 80061 LIPID PANEL: CPT

## 2022-05-27 PROCEDURE — 80053 COMPREHEN METABOLIC PANEL: CPT

## 2022-05-27 PROCEDURE — 85025 COMPLETE CBC W/AUTO DIFF WBC: CPT

## 2022-06-16 ENCOUNTER — OFFICE VISIT (OUTPATIENT)
Dept: URGENT CARE | Facility: CLINIC | Age: 52
End: 2022-06-16
Payer: COMMERCIAL

## 2022-06-16 VITALS — HEART RATE: 70 BPM | TEMPERATURE: 97.5 F | OXYGEN SATURATION: 100 % | RESPIRATION RATE: 18 BRPM

## 2022-06-16 DIAGNOSIS — M54.50 ACUTE RIGHT-SIDED LOW BACK PAIN WITHOUT SCIATICA: Primary | ICD-10-CM

## 2022-06-16 PROCEDURE — 99213 OFFICE O/P EST LOW 20 MIN: CPT | Performed by: PHYSICIAN ASSISTANT

## 2022-06-16 RX ORDER — LIDOCAINE 50 MG/G
1 PATCH TOPICAL DAILY
Qty: 30 PATCH | Refills: 0 | Status: SHIPPED | OUTPATIENT
Start: 2022-06-16

## 2022-06-16 RX ORDER — KETOROLAC TROMETHAMINE 10 MG/1
10 TABLET, FILM COATED ORAL EVERY 8 HOURS
Qty: 15 TABLET | Refills: 0 | Status: SHIPPED | OUTPATIENT
Start: 2022-06-16 | End: 2022-06-21

## 2022-06-16 RX ORDER — METHOCARBAMOL 500 MG/1
500 TABLET, FILM COATED ORAL 4 TIMES DAILY
Qty: 28 TABLET | Refills: 0 | Status: SHIPPED | OUTPATIENT
Start: 2022-06-16

## 2022-06-16 NOTE — PROGRESS NOTES
[facility]        NAME: Nicol Jo is a 47 y o  female  : 1970    MRN: [id number]  DATE: 2022  TIME: 10:14 AM    Assessment and Plan   Acute right-sided low back pain without sciatica [M54 50]  1  Acute right-sided low back pain without sciatica  ketorolac (TORADOL) 10 mg tablet    lidocaine (Lidoderm) 5 %    methocarbamol (ROBAXIN) 500 mg tablet         Patient Instructions       Follow up with PCP in 3-5 days  Proceed to  ER if symptoms worsen  Chief Complaint     Chief Complaint   Patient presents with    Back Pain     X4 days: right lower back pain  Hx of x2 lower back/spinal surgeries  Appt with PT next week  Following all previous instructions as indicated for flare up  6/10 pain noted  History of Present Illness       Patient is a 46 y/o/f presenting [facility] with right lower back pain  Patient reports pain began 4 days ago  Pt denies any inciting injury but reports painting 5 days ago  Pt woke up the following morning w/ the pain  Patient denies any lower extremity radiation or numbness/tingling  Patient has been taking Aleve and has been stretching/walking slowly without improvement  Standing is not as bad as walking  Walking elicits sharp/stabbing pain as does sitting for prolonged periods of time  Pain currently rated 6 5/10 w/ walking  Pain while sitting in clinic room is 2/10  Back Pain  This is a new problem  The current episode started in the past 7 days  The problem occurs constantly  The problem has been gradually worsening since onset  The pain is present in the lumbar spine  The quality of the pain is described as aching  The pain does not radiate  The pain is at a severity of 6/10  The symptoms are aggravated by sitting and position (Walking)   Pertinent negatives include no abdominal pain, bladder incontinence, bowel incontinence, chest pain, dysuria, fever, leg pain, numbness, paresis, paresthesias, pelvic pain, perianal numbness or tingling  Review of Systems   Review of Systems   Constitutional: Negative for chills and fever  HENT: Negative for ear pain and sore throat  Eyes: Negative for pain and visual disturbance  Respiratory: Negative for cough and shortness of breath  Cardiovascular: Negative for chest pain and palpitations  Gastrointestinal: Negative for abdominal pain, bowel incontinence and vomiting  Genitourinary: Negative for bladder incontinence, dysuria, hematuria and pelvic pain  Musculoskeletal: Positive for back pain  Negative for arthralgias  Skin: Negative for color change and rash  Neurological: Negative for tingling, seizures, syncope, numbness and paresthesias  All other systems reviewed and are negative  Current Medications       Current Outpatient Medications:     calcium citrate-vitamin D (CITRACAL+D) 315-200 MG-UNIT per tablet, Take by mouth, Disp: , Rfl:     ketorolac (TORADOL) 10 mg tablet, Take 1 tablet (10 mg total) by mouth every 8 (eight) hours for 5 days, Disp: 15 tablet, Rfl: 0    lidocaine (Lidoderm) 5 %, Apply 1 patch topically daily Remove & Discard patch within 12 hours or as directed by MD, Disp: 30 patch, Rfl: 0    methocarbamol (ROBAXIN) 500 mg tablet, Take 1 tablet (500 mg total) by mouth 4 (four) times a day, Disp: 28 tablet, Rfl: 0    Multiple Vitamin tablet, Take by mouth daily, Disp: , Rfl:     Current Allergies     Allergies as of 06/16/2022    (No Known Allergies)            The following portions of the patient's history were reviewed and updated as appropriate: allergies, current medications, past family history, past medical history, past social history, past surgical history and problem list      No past medical history on file      Past Surgical History:   Procedure Laterality Date    ARTHRODESIS      Spinal    DENTAL SURGERY      INGUINAL HERNIA REPAIR      WRIST SURGERY         Family History   Problem Relation Age of Onset    Diabetes Mother  Hypertension Mother     Heart disease Father         cardiac disorder    Stroke Father          Medications have been verified  Objective   Pulse 70   Temp 97 5 °F (36 4 °C)   Resp 18   SpO2 100%   No LMP recorded  Physical Exam     Physical Exam  Constitutional:       Appearance: Normal appearance  HENT:      Head: Normocephalic and atraumatic  Nose: Nose normal       Mouth/Throat:      Mouth: Mucous membranes are dry  Eyes:      Extraocular Movements: Extraocular movements intact  Conjunctiva/sclera: Conjunctivae normal       Pupils: Pupils are equal, round, and reactive to light  Cardiovascular:      Rate and Rhythm: Normal rate  Pulmonary:      Effort: Pulmonary effort is normal    Musculoskeletal:         General: Normal range of motion  Cervical back: Normal range of motion and neck supple  Back:    Skin:     General: Skin is warm and dry  Capillary Refill: Capillary refill takes less than 2 seconds  Neurological:      General: No focal deficit present  Mental Status: She is alert and oriented to person, place, and time     Psychiatric:         Mood and Affect: Mood normal          Behavior: Behavior normal

## 2022-06-23 ENCOUNTER — EVALUATION (OUTPATIENT)
Dept: PHYSICAL THERAPY | Age: 52
End: 2022-06-23
Payer: COMMERCIAL

## 2022-06-23 DIAGNOSIS — M54.50 ACUTE RIGHT-SIDED LOW BACK PAIN, UNSPECIFIED WHETHER SCIATICA PRESENT: Primary | ICD-10-CM

## 2022-06-23 PROCEDURE — 97140 MANUAL THERAPY 1/> REGIONS: CPT | Performed by: PHYSICAL THERAPIST

## 2022-06-23 PROCEDURE — 97162 PT EVAL MOD COMPLEX 30 MIN: CPT | Performed by: PHYSICAL THERAPIST

## 2022-06-23 PROCEDURE — 97110 THERAPEUTIC EXERCISES: CPT | Performed by: PHYSICAL THERAPIST

## 2022-06-23 NOTE — TELEPHONE ENCOUNTER
Additional Information   Negative: Is this related to a work injury?  Negative: Is this related to an MVA?  Negative: Are you currently recieving homecare services?  Negative: Has the patient had unexplained weight loss?  Negative: Does the patient have a fever?  Negative: Is the patient experiencing urine retention?  Negative: Is the patient experiencing acute drop foot or paralysis?  Negative: Has the patient experienced major trauma? (fall from height, high speed collision, direct blow to spine) and is also experiencing nausea, light-headedness, or loss of consciousness?  Negative: Is the patient experiencing blood in sputum?  Negative: Is this a chronic condition?     Protocols used: [facility] COMPREHENSIVE SPINE PROGRAM PROTOCOL

## 2022-06-23 NOTE — TELEPHONE ENCOUNTER
Additional Information   Negative: Is this related to a work injury?  Negative: Is this related to an MVA?  Negative: Are you currently recieving homecare services?  Negative: Has the patient had unexplained weight loss?     Protocols used: [facility] COMPREHENSIVE SPINE PROGRAM PROTOCOL

## 2022-06-23 NOTE — PROGRESS NOTES
PT Evaluation     Today's date: 2022  Patient name: Aaron Fernandez  : 1970  MRN: [id number]  Referring provider: Alpesh Olivier PT  Dx:   Encounter Diagnosis     ICD-10-CM    1  Acute right-sided low back pain, unspecified whether sciatica present  M54 50                   Assessment  Assessment details: Terri Esteban is a pleasant 47 y o  female who presents via direct access with acute onset of right sided-low back pain after painting for 3 days  She has intermittent right LE pain that radiates down the posterior lateral thigh towards the knee with occasional tingling in toes 3,4,5 on the dorsum of the foot  The primary movement problem is posterior derangement with symptoms produced with repeated flexion  She has lumbar hypomobility with ROM restrictions most noted in extension, poor posture and poor core activation, resulting in decreased sitting tolerance, difficulty with bed mobility, sit-stand and inability to perform household chores and exercise  The patient's greatest concern is the pain she is experiencing, concern at no signs of improvement, wanting to avoid surgery and fear of not being able to keep active  She does admit to some fear avoidance tenancies  No further referral appears necessary at this time based upon examination results  I expect she will respond to repeated extension and mobility exercises          Problem List:  1) posterior derangement syndrome responding to extension-addressing wit repeated motions  2) lumbar hypomobility-addressing with mobs and mobility exercises  3) poor core activation and postural endurance- addressing with neuromotor retraining        Comparable signs:  1) lumbar extension  2) sit-stand from chair  3) repeated flexion  Impairments: abnormal or restricted ROM, activity intolerance, impaired physical strength, lacks appropriate home exercise program, pain with function, poor posture  and poor body mechanics  Functional limitations: difficulty with bed mobility, sit to stand, decreased stting toleranceUnderstanding of Dx/Px/POC: good   Prognosis: good  Prognosis details: Positive prognostic indicators include acuity of symptoms and absence of observed red flags  Negative prognostic indicators include anxiety and 2 prior spinal surgeries  Goals  1  Promote centralization and increase in extension ROM  2  Pt will be able to get out bed with ease and roll over in bed without increase in pain  3  Pt will be able to sit in chair to read and be able to get up from chair after sitting for prolonged period with ease  4  Pt will be able to complete painting in home  5  Pt will be able to return to exercise including running and biking  6  Patient will be independent with home exercise program    7  Patient will be able to manage symptoms independently  Plan  Patient would benefit from: skilled physical therapy  Planned therapy interventions: abdominal trunk stabilization, manual therapy, joint mobilization, neuromuscular re-education, patient education, postural training, therapeutic activities, therapeutic exercise, strengthening, home exercise program, graded exercise and functional ROM exercises  Frequency: 2x week  Duration in visits: 12  Duration in weeks: 7  Plan of Care beginning date: 6/23/2022  Plan of Care expiration date: 9/23/2022        Subjective Evaluation    History of Present Illness  Date of onset: 6/13/2022  Mechanism of injury: Pt presents today via direct access  Pt was painting for 3 days in a row  She awoke on 6/13 with right sided low back pain  The pain is worse upon waking and after being sedentary  She reports the pain gets worse with sitting and worse with initially coming to stand and eases with prolonged walking  Pt does have intermittent paraesthesias to top of foot with intermittent pain radiating to posterior lateral aspect of thigh  Pt has a hx of discectomy L4-5, L5-S1 with the last surgery 2013       The patient's main concern is that her back will worsen and she wants to avoid another surgery  The patient's goals are to be able to get back to exercise including running and biking and to be able to complete iadls including finishing her painting     Quality of life: good    Pain  Current pain ratin  At worst pain ratin  Location: right low back and radiates to right leg at times to foot  Quality: dull ache, sharp and radiating  Aggravating factors: sitting and lifting    Social Support  Exterior steps/ramp assessed: single step  Lives in: one-story house    Treatments  Previous treatment: medication  Patient Goals  Patient goals for therapy: decreased pain, increased motion, independence with ADLs/IADLs and return to sport/leisure activities  Patient goal: be able to exercise, be active         Objective     Concurrent Complaints  Negative for night pain, disturbed sleep, bladder dysfunction, bowel dysfunction and saddle (S4) numbness    Additional Special Questions  No red flags     Static Posture   General Observations  Scoliosis  Lumbar Spine   Decreased lordosis  Comments  Flat back with signficant loss of lumbar lordosis, no curve reversal in lumbar and lower thoracic spine    Postural Observations  Seated posture: fair  Standing posture: fair        Tenderness     Right Hip   Tenderness in the PSIS       Additional Tenderness Details  TTP right PSIS    Neurological Testing     Sensation     Lumbar   Left   Intact: light touch and pin prick    Right   Intact: light touch and pin prick    Reflexes   Left   Patellar (L4): normal (2+)  Achilles (S1): normal (2+)    Right   Patellar (L4): normal (2+)  Achilles (S1): trace (1+)    Additional Neurological Details  Residual numbness toes 3,4,5  dorsum of foot likely related to prior history as pt noted this is not new    Active Range of Motion     Lumbar   Flexion:  with pain Restriction level: moderate  Extension:  Restriction level: maximal  Left lateral flexion:  with pain Restriction level: moderate  Right lateral flexion:  Restriction level: moderate  Left rotation:  Restriction level: moderate  Right rotation:  Restriction level: moderate    Joint Play     Hypomobile: L5 and S1     Pain: L5 and S1   L5 comments: R UPA  Mechanical Assessment    Cervical      Thoracic      Lumbar    Standing flexion: repeated movements   Pain location:peripheralized  Pain level: produced  Lying extension: repeated movements  Pain intensity: better  Pain level: decreased    Strength/Myotome Testing     Left Hip   Planes of Motion   Flexion: 5  Extension: 4-  Abduction: 4  Adduction: 5    Right Hip   Planes of Motion   Flexion: 5  Extension: 4-  Abduction: 4  Adduction: 5    Left Knee   Flexion: 5  Extension: 5    Right Knee   Flexion: 5  Extension: 5    Left Ankle/Foot   Dorsiflexion: 5  Plantar flexion: 5  Great toe extension: 5    Right Ankle/Foot   Dorsiflexion: 5  Plantar flexion: 4  Great toe extension: 4    Additional Strength Details  Pt able to heel and toe walk    Tests     Lumbar   Positive sacral thrust    Negative SIJ compression, sacroiliac distraction and Gaenslen's   Left   Negative crossed SLR, passive SLR and slump test      Right   Negative passive SLR and slump test      Left Hip   Negative LIVE and FADIR  Right Hip   Negative LIVE and FADIR       General Comments:      Lumbar Comments  Left leg appears long in supine- pt has history of scoliosis              Precautions: 2 prior discectomies (2013)        Manuals 6/23            Prone UPA                          TherEx             Pt Ed/HEP 10'            Prone prop on wedge 3'            Prone press up 3x10            GABY at hi/low table 2x10                                                                                          Ther Ex                                                                                                                     Ther Activity Gait Training                                       Modalities                                        HEP/Pt education: discussed green light/red light with repeated extension  Pt demonstrated good understanding  Post-session improved bed mobility and extension AROM

## 2022-06-23 NOTE — TELEPHONE ENCOUNTER
Additional Information   Negative: Is this related to a work injury?     Protocols used: [facility] COMPREHENSIVE SPINE PROGRAM PROTOCOL

## 2022-06-23 NOTE — TELEPHONE ENCOUNTER
Additional Information   Negative: Is this related to a work injury?  Negative: Is this related to an MVA?  Negative: Are you currently recieving homecare services?  Negative: Has the patient had unexplained weight loss?  Negative: Does the patient have a fever?  Negative: Is the patient experiencing urine retention?     Protocols used: [facility] COMPREHENSIVE SPINE PROGRAM PROTOCOL

## 2022-06-23 NOTE — TELEPHONE ENCOUNTER
Additional Information   Negative: Is this related to a work injury?  Negative: Is this related to an MVA?  Negative: Are you currently recieving homecare services?  Negative: Has the patient had unexplained weight loss?  Negative: Does the patient have a fever?  Negative: Is the patient experiencing urine retention?  Negative: Is the patient experiencing acute drop foot or paralysis?     Protocols used: [facility] COMPREHENSIVE SPINE PROGRAM PROTOCOL

## 2022-06-23 NOTE — TELEPHONE ENCOUNTER
Additional Information   Negative: Is this related to a work injury?  Negative: Is this related to an MVA?  Negative: Are you currently recieving homecare services?     Protocols used: [facility] COMPREHENSIVE SPINE PROGRAM PROTOCOL

## 2022-06-27 ENCOUNTER — OFFICE VISIT (OUTPATIENT)
Dept: INTERNAL MEDICINE CLINIC | Facility: CLINIC | Age: 52
End: 2022-06-27
Payer: COMMERCIAL

## 2022-06-27 VITALS
HEART RATE: 69 BPM | TEMPERATURE: 97.8 F | WEIGHT: 143 LBS | OXYGEN SATURATION: 99 % | BODY MASS INDEX: 21.18 KG/M2 | SYSTOLIC BLOOD PRESSURE: 120 MMHG | DIASTOLIC BLOOD PRESSURE: 72 MMHG | HEIGHT: 69 IN

## 2022-06-27 DIAGNOSIS — M54.50 CHRONIC BILATERAL LOW BACK PAIN WITHOUT SCIATICA: Primary | ICD-10-CM

## 2022-06-27 DIAGNOSIS — Z98.890 HISTORY OF HEMILAMINECTOMY: ICD-10-CM

## 2022-06-27 DIAGNOSIS — G89.29 CHRONIC BILATERAL LOW BACK PAIN WITHOUT SCIATICA: Primary | ICD-10-CM

## 2022-06-27 PROBLEM — R93.5 ABNORMAL CT SCAN, PELVIS: Status: ACTIVE | Noted: 2022-06-27

## 2022-06-27 PROCEDURE — 3008F BODY MASS INDEX DOCD: CPT | Performed by: NURSE PRACTITIONER

## 2022-06-27 PROCEDURE — 99214 OFFICE O/P EST MOD 30 MIN: CPT | Performed by: NURSE PRACTITIONER

## 2022-06-27 PROCEDURE — 1036F TOBACCO NON-USER: CPT | Performed by: NURSE PRACTITIONER

## 2022-06-27 RX ORDER — METHYLPREDNISOLONE 4 MG/1
TABLET ORAL
Qty: 21 EACH | Refills: 0 | Status: SHIPPED | OUTPATIENT
Start: 2022-06-27

## 2022-06-27 NOTE — PATIENT INSTRUCTIONS
Gabapentin (Neurontin)  Gabapentin (By mouth)   Gabapentin (arsalan-a-PEN-tin)  Treats seizures and pain caused by shingles  Brand Name(s): FusePaq Fanatrex, Neurontin   There may be other brand names for this medicine  When This Medicine Should Not Be Used: This medicine is not right for everyone  Do not use it if you had an allergic reaction to gabapentin  How to Use This Medicine:   Capsule, Liquid, Tablet  Take your medicine as directed  Your dose may need to be changed several times to find what works best for you  If you have epilepsy, do not allow more than 12 hours to pass between doses  Capsule: Swallow the capsule whole with plenty of water  Do not open, crush, or chew it  Gralise® tablet: Swallow the tablet whole   Do not crush, break, or chew it  Neurontin® tablet: If you break a tablet into 2 pieces, use the second half as your next dose  Do not use the half-tablet if the whole tablet has been cut or broken after 28 days  Oral liquid: Measure the oral liquid medicine with a marked measuring spoon, oral syringe, or medicine cup  This medicine should come with a Medication Guide  Ask your pharmacist for a copy if you do not have one  Missed dose: Take a dose as soon as you remember  If it is almost time for your next dose, wait until then and take a regular dose  Do not take extra medicine to make up for a missed dose  Store the medicine in a closed container at room temperature, away from heat, moisture, and direct light  Store the Neurontin® oral liquid in the refrigerator  Do not freeze  Drugs and Foods to Avoid:   Ask your doctor or pharmacist before using any other medicine, including over-the-counter medicines, vitamins, and herbal products  Some medicines can affect how gabapentin works  Tell your doctor if you also using hydrocodone or morphine  If you take an antacid, wait at least 2 hours before you take gabapentin  Do not drink alcohol while you are using this medicine    Tell your doctor if you use anything else that makes you sleepy  Some examples are allergy medicine, narcotic pain medicine, and alcohol  Tell your doctor if you are also using lorazepam, oxycodone, or zolpidem  Warnings While Using This Medicine:   Tell your doctor if you are pregnant or breastfeeding, or if you have kidney problems (including patients receiving dialysis) or lung problems  Tell your doctor if you have a history of depression or mental health problems  This medicine may cause the following problems:  Drug reaction with eosinophilia and systemic symptoms (DRESS) or multiorgan hypersensitivity, which may damage the liver, kidney, blood, heart, or muscles  Changes in mood or behavior, including suicidal thoughts or behavior  Respiratory depression (serious breathing problem that can be life-threatening), when used with narcotic pain medicines  Do not stop using this medicine suddenly  Your doctor will need to slowly decrease your dose before you stop it completely  This medicine may make you dizzy or drowsy  Do not drive or do anything else that could be dangerous until you know how this medicine affects you  Tell any doctor or dentist who treats you that you are using this medicine  This medicine may affect certain medical test results  Your doctor will check your progress and the effects of this medicine at regular visits  Keep all appointments  Keep all medicine out of the reach of children  Never share your medicine with anyone  Possible Side Effects While Using This Medicine:   Call your doctor right away if you notice any of these side effects:   Allergic reaction: Itching or hives, swelling in your face or hands, swelling or tingling in your mouth or throat, chest tightness, trouble breathing  Behavior problems, aggression, restlessness, trouble concentrating, moodiness (especially in children)  Blistering, peeling, red skin rash  Blue lips, fingernails, or skin, chest pain, fast heartbeat, trouble breathing  Change in how much or how often you urinate, bloody or cloudy urine  Dark urine or pale stools, nausea, vomiting, loss of appetite, stomach pain, yellow skin or eyes  Fever, chills, cough, sore throat, body aches  Problems with coordination, shakiness, unsteadiness, unusual eye movement  Rapid weight gain, swelling in your hands, ankles, or feet  Rash, swollen or tender glands in the neck, armpit, or groin  Unusual moods or behaviors, thoughts of hurting yourself, feeling depressed  If you notice these less serious side effects, talk with your doctor:   Dizziness, drowsiness, sleepiness, tiredness  If you notice other side effects that you think are caused by this medicine, tell your doctor  Call your doctor for medical advice about side effects  You may report side effects to FDA at 0-202-FDA-2591    © Copyright [employer] 2022 Information is for End User's use only and may not be sold, redistributed or otherwise used for commercial purposes  The above information is an  only  It is not intended as medical advice for individual conditions or treatments  Talk to your doctor, nurse or pharmacist before following any medical regimen to see if it is safe and effective for you     __________________________________________________________    Problem List Items Addressed This Visit          Other    Chronic bilateral low back pain without sciatica - Primary     6/16/2022 to Urgent care:   X4 days: right lower back pain  Hx of x2 lower back/spinal surgeries  Appt with PT next week  Following all previous instructions as indicated for flare up  6/10 pain noted  Patient is a 48 y/o/f presenting to [facility] with right lower back pain  Patient reports pain began 4 days ago  Pt denies any inciting injury but reports painting 5 days ago  Pt woke up the following morning w/ the pain  Patient denies any lower extremity radiation or numbness/tingling    Patient has been taking Aleve and has been stretching/walking slowly without improvement  Standing is not as bad as walking  Walking elicits sharp/stabbing pain as does sitting for prolonged periods of time  Pain currently rated 6 5/10 w/ walking  Pain while sitting in clinic room is 2/10  Back Pain  This is a new problem  The current episode started in the past 7 days  The problem occurs constantly  The problem has been gradually worsening since onset  The pain is present in the lumbar spine  The quality of the pain is described as aching  The pain does not radiate  The pain is at a severity of 6/10  The symptoms are aggravated by sitting and position (Walking)  Pertinent negatives include no abdominal pain, bladder incontinence, bowel incontinence, chest pain, dysuria, fever, leg pain, numbness, paresis, paresthesias, pelvic pain, perianal numbness or tingling  Given: Toradol, robaxin and lidoderm patch  6/27/2022  Pt is here today for lower back pain  The pain has been going on for the past 2-3 weeks  Went to Urgent Care and they prescribed a 5 day course of an antiinflammatory which didn't help  She started PT last week and the PT mentioned possibly a steroid  Offered xray - but the patient declined, states that only an MRI has shown her issues  She has taken a number of NSAIDS  We discussed using neurontin for nerve pains - she will look into this medication and let us know  Will give her a medrol dose pack and monitor  She will wait to see if she improves for a few more days and then go to pick this medication up  Pain and numbness do follow the dermatome tract of L5 and S1 which include her posterior right thigh and right foot toes  Discussed using Voltaren Cream             Relevant Medications    methylPREDNISolone 4 MG tablet therapy pack    History of hemilaminectomy     4/2/2013 MRI Lumbar Spine:  FINDINGS:   L4-L5 there is a small broad-based disc bulge, but no central canal or neural foraminal stenosis    L5-S1 there has been a right hemilaminectomy  There is a small broad-based disc bulge, but no central canal or neural foraminal stenosis  There is enhancement at the surgical site and in the right aspect of the epidural space  There is also a small fluid collection in the paraspinal soft tissues adjacent to the right spinous process  There is no enhancement around this collection  IMPRESSION:  "There are postoperative changes of a right hemilaminectomy, and probable discetomy at L5-S1  Depending on when the surgery occurred, the enhancing soft tissues in this area likely represent either postoperative change or possibly scar formation  There is a small fluid collection at the surgical site which I believe represents small seroma  There is no enhancement to suggest abscess  There is not spinal canal stenosis or neural foraminal narrowing at any level " Per Brian Gaston    2/9/2015 MRI lumbar spine  FINDINGS: There is mild L4 and L5-S1 disc space narrowing desiccation  Mild scoliotic change noted  L4-L5 there is minor annular bulge and mild facet arthrosis without significant central canal compromise or neuroforaminal narrowing  L5-S1 there is diffuse annular bulge and mild facet arthrosis without significant central canan stenosis or neuroforaminal narrowing  IMPRESSION:  Mild degenerative changes in the lumbar spine as described   Per Choco Rothman MD             Relevant Medications    methylPREDNISolone 4 MG tablet therapy pack

## 2022-06-27 NOTE — PROGRESS NOTES
Assessment/Plan:     Problem List Items Addressed This Visit        Other    Chronic bilateral low back pain without sciatica - Primary     6/16/2022 to Urgent care:   X4 days: right lower back pain  Hx of x2 lower back/spinal surgeries  Appt with PT next week  Following all previous instructions as indicated for flare up  6/10 pain noted  Patient is a 46 y/o/f presenting to [facility] with right lower back pain  Patient reports pain began 4 days ago  Pt denies any inciting injury but reports painting 5 days ago  Pt woke up the following morning w/ the pain  Patient denies any lower extremity radiation or numbness/tingling  Patient has been taking Aleve and has been stretching/walking slowly without improvement  Standing is not as bad as walking  Walking elicits sharp/stabbing pain as does sitting for prolonged periods of time  Pain currently rated 6 5/10 w/ walking  Pain while sitting in clinic room is 2/10  Back Pain  This is a new problem  The current episode started in the past 7 days  The problem occurs constantly  The problem has been gradually worsening since onset  The pain is present in the lumbar spine  The quality of the pain is described as aching  The pain does not radiate  The pain is at a severity of 6/10  The symptoms are aggravated by sitting and position (Walking)  Pertinent negatives include no abdominal pain, bladder incontinence, bowel incontinence, chest pain, dysuria, fever, leg pain, numbness, paresis, paresthesias, pelvic pain, perianal numbness or tingling  Given: Toradol, robaxin and lidoderm patch  6/27/2022  Pt is here today for lower back pain  The pain has been going on for the past 2-3 weeks  Went to Urgent Care and they prescribed a 5 day course of an antiinflammatory which didn't help  She started PT last week and the PT mentioned possibly a steroid     Offered xray - but the patient declined, states that only an MRI has shown her issues  She has taken a number of NSAIDS  We discussed using neurontin for nerve pains - she will look into this medication and let us know  Will give her a medrol dose pack and monitor  She will wait to see if she improves for a few more days and then go to pick this medication up  Pain and numbness do follow the dermatome tract of L5 and S1 which include her posterior right thigh and right foot toes  Discussed using Voltaren Cream             Relevant Medications    methylPREDNISolone 4 MG tablet therapy pack    History of hemilaminectomy     4/2/2013 MRI Lumbar Spine:  FINDINGS:   L4-L5 there is a small broad-based disc bulge, but no central canal or neural foraminal stenosis  L5-S1 there has been a right hemilaminectomy  There is a small broad-based disc bulge, but no central canal or neural foraminal stenosis  There is enhancement at the surgical site and in the right aspect of the epidural space  There is also a small fluid collection in the paraspinal soft tissues adjacent to the right spinous process  There is no enhancement around this collection  IMPRESSION:  "There are postoperative changes of a right hemilaminectomy, and probable discetomy at L5-S1  Depending on when the surgery occurred, the enhancing soft tissues in this area likely represent either postoperative change or possibly scar formation  There is a small fluid collection at the surgical site which I believe represents small seroma  There is no enhancement to suggest abscess  There is not spinal canal stenosis or neural foraminal narrowing at any level " Per Brian Gaston    2/9/2015 MRI lumbar spine  FINDINGS: There is mild L4 and L5-S1 disc space narrowing desiccation  Mild scoliotic change noted  L4-L5 there is minor annular bulge and mild facet arthrosis without significant central canal compromise or neuroforaminal narrowing  L5-S1 there is diffuse annular bulge and mild facet arthrosis without significant central canan stenosis or neuroforaminal narrowing  IMPRESSION:  Mild degenerative changes in the lumbar spine as described  Per Emily Maxwell MD             Relevant Medications    methylPREDNISolone 4 MG tablet therapy pack          Subjective:      Patient ID: Patsy Cushing is a 47 y o  female  The patient is here with complaints regarding her lower back pain  She is having some acute issues, but this is a truly chronic condition  The following portions of the patient's history were reviewed and updated as appropriate:     Past Medical History:  She has no past medical history on file ,  _______________________________________________________________________  Medical Problems:  does not have any pertinent problems on file ,  _______________________________________________________________________  Past Surgical History:   has a past surgical history that includes Dental surgery; Inguinal hernia repair; Arthrodesis; and Wrist surgery  ,  _______________________________________________________________________  Family History:  family history includes Diabetes in her mother; Heart disease in her father; Hypertension in her mother; Stroke in her father ,  _______________________________________________________________________  Social History:   reports that she has never smoked  She has never used smokeless tobacco  She reports current alcohol use  She reports that she does not use drugs  ,  _______________________________________________________________________  Allergies:  has No Known Allergies     _______________________________________________________________________  Current Outpatient Medications   Medication Sig Dispense Refill    calcium citrate-vitamin D (CITRACAL+D) 315-200 MG-UNIT per tablet Take 1 tablet by mouth daily      methylPREDNISolone 4 MG tablet therapy pack Use as directed on package 21 each 0    Multiple Vitamin tablet Take by mouth daily      ketorolac (TORADOL) 10 mg tablet Take 1 tablet (10 mg total) by mouth every 8 (eight) hours for 5 days 15 tablet 0    lidocaine (Lidoderm) 5 % Apply 1 patch topically daily Remove & Discard patch within 12 hours or as directed by MD (Patient not taking: Reported on 6/27/2022) 30 patch 0    methocarbamol (ROBAXIN) 500 mg tablet Take 1 tablet (500 mg total) by mouth 4 (four) times a day (Patient not taking: Reported on 6/27/2022) 28 tablet 0     No current facility-administered medications for this visit      _______________________________________________________________________      Review of Systems   Musculoskeletal: Positive for arthralgias, back pain and myalgias  Objective:  Vitals:    06/27/22 1242   BP: 120/72   Pulse: 69   Temp: 97 8 °F (36 6 °C)   TempSrc: Tympanic   SpO2: 99%   Weight: 64 9 kg (143 lb)   Height: 5' 9" (1 753 m)     Body mass index is 21 12 kg/m²  Physical Exam  Musculoskeletal:        Back:    Feet:      Comments: Right foot, toes # 4 and 5 with numbness  - chronic since last surgery in 2015

## 2022-06-27 NOTE — ASSESSMENT & PLAN NOTE
· 4/2/2013 MRI Lumbar Spine:  · FINDINGS:   · L4-L5 there is a small broad-based disc bulge, but no central canal or neural foraminal stenosis  · L5-S1 there has been a right hemilaminectomy  There is a small broad-based disc bulge, but no central canal or neural foraminal stenosis  There is enhancement at the surgical site and in the right aspect of the epidural space  There is also a small fluid collection in the paraspinal soft tissues adjacent to the right spinous process  There is no enhancement around this collection  · IMPRESSION:  · "There are postoperative changes of a right hemilaminectomy, and probable discetomy at L5-S1  Depending on when the surgery occurred, the enhancing soft tissues in this area likely represent either postoperative change or possibly scar formation  There is a small fluid collection at the surgical site which I believe represents small seroma  There is no enhancement to suggest abscess  There is not spinal canal stenosis or neural foraminal narrowing at any level " Per Te Skill  · 2/9/2015 MRI lumbar spine  · FINDINGS: There is mild L4 and L5-S1 disc space narrowing desiccation  Mild scoliotic change noted  · L4-L5 there is minor annular bulge and mild facet arthrosis without significant central canal compromise or neuroforaminal narrowing  · L5-S1 there is diffuse annular bulge and mild facet arthrosis without significant central canan stenosis or neuroforaminal narrowing  · IMPRESSION:  · Mild degenerative changes in the lumbar spine as described   Per Toño Owens MD

## 2022-06-27 NOTE — ASSESSMENT & PLAN NOTE
· 6/16/2022 to Urgent care:   · X4 days: right lower back pain  Hx of x2 lower back/spinal surgeries  Appt with PT next week  Following all previous instructions as indicated for flare up  6/10 pain noted  · Patient is a 46 y/o/f presenting [facility] with right [facility]  Patient reports pain began 4 days ago  Pt denies any inciting injury but reports painting 5 days ago  Pt woke up the following morning w/ the pain  Patient denies any lower extremity radiation or numbness/tingling  Patient has been taking Aleve and has been stretching/walking slowly without improvement  Standing is not as bad as walking  Walking elicits sharp/stabbing pain as does sitting for prolonged periods of time  Pain currently rated 6 5/10 w/ walking  Pain while sitting in clinic room is 2/10  · Back Pain  · This is a new problem  The current episode started in the past 7 days  The problem occurs constantly  The problem has been gradually worsening since onset  The pain is present in the lumbar spine  The quality of the pain is described as aching  The pain does not radiate  The pain is at a severity of 6/10  The symptoms are aggravated by sitting and position (Walking)  Pertinent negatives include no abdominal pain, bladder incontinence, bowel incontinence, chest pain, dysuria, fever, leg pain, numbness, paresis, paresthesias, pelvic pain, perianal numbness or tingling  · Given: Toradol, robaxin and lidoderm patch  · 6/27/2022  · Pt is here today for lower back pain  · The pain has been going on for the past 2-3 weeks  · Went to Urgent Care and they prescribed a 5 day course of an antiinflammatory which didn't help  · She started PT last week and the PT mentioned possibly a steroid     · Offered xray - but the patient declined, states that only an MRI has shown her issues  · She has taken a number of NSAIDS  · We discussed using neurontin for nerve pains - she will look into this medication and let us know  · Will give her a medrol dose pack and monitor  She will wait to see if she improves for a few more days and then go to pick this medication up  · Pain and numbness do follow the dermatome tract of L5 and S1 which include her posterior right thigh and right foot toes     · Discussed using Voltaren Cream

## 2022-06-27 NOTE — ASSESSMENT & PLAN NOTE
· 1/30/2016  · OSSEOUS STRUCTURES:  Within the bony pelvis there is a nonexpansile uniform sclerotic 11 x 4 mm focus seen within the left iliac wing as well as a tiny rounded sclerotic focus in the left sacral brittany  Both have features which favor a benign etiology such as a bone island  If there is clinical suspicion for metastatic disease, a radionuclide bone scan could be considered to assess for metabolic activity  There is no evidence for pelvic fracture demonstrated  However, there is subtle sclerosis and overlying productive bone formation seen along the right L5 lamina which could indicate an old healed fracture or incomplete chronic or developmental spondylolysis  The sacrum and coccyx appear otherwise within normal limits  · IMPRESSION:   · No acute abnormality identified    · Sclerotic foci within the pelvis as discussed above  Please see comment    · Possible old right L5 spondylolysis  · Per: Brad Xie    · 6/27/2022  ·

## 2022-06-29 ENCOUNTER — OFFICE VISIT (OUTPATIENT)
Dept: PHYSICAL THERAPY | Age: 52
End: 2022-06-29
Payer: COMMERCIAL

## 2022-06-29 DIAGNOSIS — M54.50 ACUTE RIGHT-SIDED LOW BACK PAIN, UNSPECIFIED WHETHER SCIATICA PRESENT: Primary | ICD-10-CM

## 2022-06-29 PROCEDURE — 97110 THERAPEUTIC EXERCISES: CPT

## 2022-06-29 NOTE — PROGRESS NOTES
Daily Note     Today's date: 2022  Patient name: João Lainez  : 1970  MRN: [id number]  Referring provider: Dagmar Méndez, PT  Dx:   Encounter Diagnosis     ICD-10-CM    1  Acute right-sided low back pain, unspecified whether sciatica present  M54 50                   Subjective: Patient reports she has had no pain since IE, would like a core strengthening program      Objective: See treatment diary below      Assessment: Patient with good tolerance to exercise progression  Occasional postural cuing provided for form maintenance  Positive response to modified extension program and knowledgeable regarding sx onset and sx management  Patient denies pain post session  Reviewed body mechanics t/o ADLs  Plan: Cont per plan of care        Precautions: 2 prior discectomies ()        Manuals            Prone UPA                          TherEx             Pt Ed/HEP 10'            Prone prop on wedge 3' 3'           Prone press up 3x10 3x10           GABY at hi/low table 2x10 2x10           TM   10'           Prone glute set  20x           Prone hip extension  20x           Ball squats at wall  10x ea           Tband pull downs   20x BTB           Mult press   BTB 20x           Tband rows  20x BTB           Ther Ex                          Hip abd  30# 20x           Hip add  30# 20x                                                                            Ther Activity                                       Gait Training                                       Modalities

## 2022-08-02 NOTE — PROGRESS NOTES
Phone call to patient, stated she was doing well  No pain and able to return prior activities   Discharge PT

## 2022-09-06 ENCOUNTER — TELEPHONE (OUTPATIENT)
Dept: PHYSICAL THERAPY | Facility: OTHER | Age: 52
End: 2022-09-06

## 2022-09-06 NOTE — TELEPHONE ENCOUNTER
Patient filled out On-Line form for [facility]  Nurse reached out to discuss the program with her  [facility] OV 6/16/22 d/t acute low back pain  PT evaluation 6/23/22 per EMR  Nurse received pts VM, but unable to LM  Nurse faintly heard female voice on occasion under a loud squeal/static during the VM greeting  No referral to defer  Will await call from patient

## 2022-09-08 ENCOUNTER — TELEPHONE (OUTPATIENT)
Dept: INTERNAL MEDICINE CLINIC | Facility: CLINIC | Age: 52
End: 2022-09-08

## 2022-09-08 DIAGNOSIS — M54.50 CHRONIC BILATERAL LOW BACK PAIN WITHOUT SCIATICA: Primary | ICD-10-CM

## 2022-09-08 DIAGNOSIS — G89.29 CHRONIC BILATERAL LOW BACK PAIN WITHOUT SCIATICA: Primary | ICD-10-CM

## 2022-09-08 RX ORDER — METHYLPREDNISOLONE 4 MG/1
TABLET ORAL
Qty: 21 EACH | Refills: 0 | Status: SHIPPED | OUTPATIENT
Start: 2022-09-08 | End: 2023-02-28

## 2022-09-08 NOTE — TELEPHONE ENCOUNTER
Pt called requesting a prednisone shan be sent to 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

## 2023-02-27 ENCOUNTER — TELEPHONE (OUTPATIENT)
Dept: INTERNAL MEDICINE CLINIC | Facility: CLINIC | Age: 53
End: 2023-02-27

## 2023-02-27 NOTE — TELEPHONE ENCOUNTER
Called pt to reschedule appt on 4/4 due to you being out of the office that day  She is unsure when she is able to reschedule at this time but is asking if you can place an order for a Lipid Panel for her to get done prior to when she reschedules this appt  Please advise

## 2023-02-28 DIAGNOSIS — E78.00 HYPERCHOLESTEROLEMIA: Primary | ICD-10-CM

## 2023-02-28 PROBLEM — G57.60 MORTON'S METATARSALGIA: Status: ACTIVE | Noted: 2022-05-31

## 2023-05-06 ENCOUNTER — LAB (OUTPATIENT)
Dept: LAB | Facility: CLINIC | Age: 53
End: 2023-05-06

## 2023-05-06 DIAGNOSIS — E78.00 HYPERCHOLESTEROLEMIA: ICD-10-CM

## 2023-05-06 LAB
CHOLEST SERPL-MCNC: 164 MG/DL
HDLC SERPL-MCNC: 52 MG/DL
LDLC SERPL CALC-MCNC: 100 MG/DL (ref 0–100)
TRIGL SERPL-MCNC: 61 MG/DL

## 2023-05-23 ENCOUNTER — TELEPHONE (OUTPATIENT)
Dept: INTERNAL MEDICINE CLINIC | Facility: CLINIC | Age: 53
End: 2023-05-23

## 2023-05-23 NOTE — TELEPHONE ENCOUNTER
Patient will be traveling to [place]  Would like to know if there are any vaccines or antimalaria medication needed for this trip? Please advise  Thank you

## 2023-05-24 DIAGNOSIS — Z71.84 COUNSELING FOR TRAVEL: Primary | ICD-10-CM

## 2023-05-31 DIAGNOSIS — A09 TRAVELER'S DIARRHEA: Primary | ICD-10-CM

## 2023-05-31 RX ORDER — CIPROFLOXACIN 500 MG/1
500 TABLET, FILM COATED ORAL EVERY 12 HOURS SCHEDULED
Qty: 14 TABLET | Refills: 0 | Status: SHIPPED | OUTPATIENT
Start: 2023-05-31 | End: 2023-06-07

## 2023-07-31 ENCOUNTER — TELEPHONE (OUTPATIENT)
Dept: INFECTIOUS DISEASES | Facility: CLINIC | Age: 53
End: 2023-07-31

## 2023-07-31 NOTE — TELEPHONE ENCOUNTER
Contacted pt to schedule appt for travel clinic. lmom to call office back at her earliest convenience.

## 2023-11-29 ENCOUNTER — RA CDI HCC (OUTPATIENT)
Dept: OTHER | Facility: HOSPITAL | Age: 53
End: 2023-11-29

## 2023-11-29 NOTE — PROGRESS NOTES
[facility] [address] [facility] coding opportunities       Chart reviewed, no opportunity found: CHART REVIEWED, NO OPPORTUNITY FOUND        Patients Insurance        Commercial Insurance: Juanito Chowdhury

## 2023-12-06 ENCOUNTER — OFFICE VISIT (OUTPATIENT)
Dept: INTERNAL MEDICINE CLINIC | Facility: CLINIC | Age: 53
End: 2023-12-06
Payer: COMMERCIAL

## 2023-12-06 VITALS
BODY MASS INDEX: 21.74 KG/M2 | HEART RATE: 50 BPM | WEIGHT: 146.8 LBS | DIASTOLIC BLOOD PRESSURE: 78 MMHG | HEIGHT: 69 IN | SYSTOLIC BLOOD PRESSURE: 118 MMHG | TEMPERATURE: 97.1 F | OXYGEN SATURATION: 99 %

## 2023-12-06 DIAGNOSIS — Z13.220 SCREENING FOR HYPERCHOLESTEROLEMIA: ICD-10-CM

## 2023-12-06 DIAGNOSIS — Z12.11 COLON CANCER SCREENING: ICD-10-CM

## 2023-12-06 DIAGNOSIS — Z13.1 SCREENING FOR DIABETES MELLITUS: ICD-10-CM

## 2023-12-06 DIAGNOSIS — Z53.20: ICD-10-CM

## 2023-12-06 DIAGNOSIS — Z12.31 ENCOUNTER FOR SCREENING MAMMOGRAM FOR BREAST CANCER: Primary | ICD-10-CM

## 2023-12-06 DIAGNOSIS — Z13.0 SCREENING, ANEMIA, DEFICIENCY, IRON: ICD-10-CM

## 2023-12-06 PROCEDURE — 99213 OFFICE O/P EST LOW 20 MIN: CPT | Performed by: INTERNAL MEDICINE

## 2023-12-07 NOTE — PROGRESS NOTES
Assessment/Plan:    Problem List Items Addressed This Visit    None  Visit Diagnoses     Encounter for screening mammogram for breast cancer    -  Primary    Colon cancer screening        Relevant Orders    Ambulatory Referral to Gastroenterology    Body mass index (BMI) determination declined        Relevant Orders    DXA body comp analysis    Screening for hypercholesterolemia        Relevant Orders    Lipid Panel with Direct LDL reflex    Screening for diabetes mellitus        Relevant Orders    Comprehensive metabolic panel    Screening, anemia, deficiency, iron        Relevant Orders    CBC and differential           Diagnoses and all orders for this visit:    Encounter for screening mammogram for breast cancer    Colon cancer screening  -     Ambulatory Referral to Gastroenterology; Future    Body mass index (BMI) determination declined  -     DXA body comp analysis; Future    Screening for hypercholesterolemia  -     Lipid Panel with Direct LDL reflex; Future    Screening for diabetes mellitus  -     Comprehensive metabolic panel; Future    Screening, anemia, deficiency, iron  -     CBC and differential; Future        No problem-specific Assessment & Plan notes found for this encounter. Subjective:      Patient ID: Abundio Boeck is a 47 y.o. female. HPI    The following portions of the patient's history were reviewed and updated as appropriate:   She has no past medical history on file. ,  does not have any pertinent problems on file. ,   has a past surgical history that includes Dental surgery; Inguinal hernia repair; Arthrodesis; Wrist surgery; and Spine surgery (2001 & 2013). ,  family history includes Breast cancer in her maternal aunt and mother; Breast cancer additional onset in her maternal aunt; Cancer in her cousin; Colon cancer in her mother; Diabetes in her mother; Heart disease in her father; Hypertension in her mother; Stroke in her father. ,   reports that she has never smoked.  She has never used smokeless tobacco. She reports that she does not currently use alcohol. She reports that she does not use drugs. ,  has No Known Allergies. .  Current Outpatient Medications   Medication Sig Dispense Refill   • calcium citrate-vitamin D (CITRACAL+D) 315-200 MG-UNIT per tablet Take 1 tablet by mouth daily     • Multiple Vitamin tablet Take by mouth daily       No current facility-administered medications for this visit. Review of Systems   Constitutional:  Negative for chills, fatigue and fever. HENT: Negative. Respiratory:  Negative for cough, chest tightness and shortness of breath. Cardiovascular:  Negative for chest pain, palpitations and leg swelling. Gastrointestinal:  Negative for abdominal pain, constipation, diarrhea, nausea and vomiting. Genitourinary: Negative. Musculoskeletal:  Negative for arthralgias, back pain and myalgias. Skin: Negative. Neurological: Negative. Psychiatric/Behavioral: Negative. Objective:  Vitals:    12/06/23 1527   BP: 118/78   BP Location: Right arm   Patient Position: Sitting   Pulse: (!) 50   Temp: (!) 97.1 °F (36.2 °C)   TempSrc: Tympanic   SpO2: 99%   Weight: 66.6 kg (146 lb 12.8 oz)   Height: 5' 9" (1.753 m)     Body mass index is 21.68 kg/m². Physical Exam  Vitals and nursing note reviewed. Constitutional:       Appearance: She is well-developed. HENT:      Head: Normocephalic and atraumatic. Eyes:      Pupils: Pupils are equal, round, and reactive to light. Pulmonary:      Effort: Pulmonary effort is normal.      Breath sounds: Normal breath sounds. Abdominal:      Palpations: Abdomen is soft. Neurological:      Mental Status: She is alert and oriented to person, place, and time.           PHQ-2/9 Depression Screening    Little interest or pleasure in doing things: 0 - not at all  Feeling down, depressed, or hopeless: 0 - not at all  PHQ-2 Score: 0  PHQ-2 Interpretation: Negative depression screen

## 2023-12-26 ENCOUNTER — TELEPHONE (OUTPATIENT)
Age: 53
End: 2023-12-26

## 2023-12-26 ENCOUNTER — PREP FOR PROCEDURE (OUTPATIENT)
Age: 53
End: 2023-12-26

## 2023-12-26 DIAGNOSIS — Z86.010 HISTORY OF COLON POLYPS: Primary | ICD-10-CM

## 2023-12-26 DIAGNOSIS — Z80.0 FAMILY HISTORY OF COLON CANCER: ICD-10-CM

## 2023-12-26 NOTE — TELEPHONE ENCOUNTER
12/26/23  Screened by: Luz Maria Bolaños    Referring Provider     Pre- Screening:     There is no height or weight on file to calculate BMI.  Has patient been referred for a routine screening Colonoscopy? yes  Is the patient between 45-75 years old? yes      Previous Colonoscopy yes   If yes:    Date: May 2020    Facility:     Reason:       SCHEDULING STAFF: If the patient is between 45yrs-49yrs, please advise patient to confirm benefits/coverage with their insurance company for a routine screening colonoscopy, some insurance carriers will only cover at 50yrs or older. If the patient is over 75years old, please schedule an office visit.     Does the patient want to see a Gastroenterologist prior to their procedure OR are they having any GI symptoms? no    Has the patient been hospitalized or had abdominal surgery in the past 6 months? no    Does the patient use supplemental oxygen? no    Does the patient take Coumadin, Lovenox, Plavix, Elliquis, Xarelto, or other blood thinning medication? no    Has the patient had a stroke, cardiac event, or stent placed in the past year? no    SCHEDULING STAFF: If patient answers NO to above questions, then schedule procedure. If patient answers YES to above questions, then schedule office appointment.     PT PASSED OA    If patient is between 45yrs - 49yrs, please advise patient that we will have to confirm benefits & coverage with their insurance company for a routine screening colonoscopy.

## 2023-12-26 NOTE — TELEPHONE ENCOUNTER
Scheduled date of colonoscopy (as of today): 5/23/24  Physician performing colonoscopy: Dr. Wu  Location of colonoscopy: [facility]  Bowel prep reviewed with patient: Miralax/Dulcolax  Instructions reviewed with patient by: Luz Maria VELOZ, sent via [employer]  Clearances: N/A

## 2024-03-01 ENCOUNTER — TELEPHONE (OUTPATIENT)
Age: 54
End: 2024-03-01

## 2024-03-01 NOTE — TELEPHONE ENCOUNTER
Pt called for travel clinic but after learning that the yellow fever vaccine is good for life/ and there is no booster/ Isa ID/ pt states that she is caught up on all her other vaccines.    No appt scheduled

## 2024-03-23 ENCOUNTER — LAB (OUTPATIENT)
Dept: LAB | Facility: CLINIC | Age: 54
End: 2024-03-23
Payer: COMMERCIAL

## 2024-03-23 DIAGNOSIS — Z13.1 SCREENING FOR DIABETES MELLITUS: ICD-10-CM

## 2024-03-23 DIAGNOSIS — Z13.220 SCREENING FOR HYPERCHOLESTEROLEMIA: ICD-10-CM

## 2024-03-23 DIAGNOSIS — Z13.0 SCREENING, ANEMIA, DEFICIENCY, IRON: ICD-10-CM

## 2024-03-23 LAB
ALBUMIN SERPL BCP-MCNC: 4.3 G/DL (ref 3.5–5)
ALP SERPL-CCNC: 46 U/L (ref 34–104)
ALT SERPL W P-5'-P-CCNC: 9 U/L (ref 7–52)
ANION GAP SERPL CALCULATED.3IONS-SCNC: 5 MMOL/L (ref 4–13)
AST SERPL W P-5'-P-CCNC: 15 U/L (ref 13–39)
BASOPHILS # BLD AUTO: 0.02 THOUSANDS/ÂΜL (ref 0–0.1)
BASOPHILS NFR BLD AUTO: 0 % (ref 0–1)
BILIRUB SERPL-MCNC: 0.6 MG/DL (ref 0.2–1)
BUN SERPL-MCNC: 6 MG/DL (ref 5–25)
CALCIUM SERPL-MCNC: 9.1 MG/DL (ref 8.4–10.2)
CHLORIDE SERPL-SCNC: 104 MMOL/L (ref 96–108)
CHOLEST SERPL-MCNC: 158 MG/DL
CO2 SERPL-SCNC: 30 MMOL/L (ref 21–32)
CREAT SERPL-MCNC: 0.68 MG/DL (ref 0.6–1.3)
EOSINOPHIL # BLD AUTO: 0.06 THOUSAND/ÂΜL (ref 0–0.61)
EOSINOPHIL NFR BLD AUTO: 1 % (ref 0–6)
ERYTHROCYTE [DISTWIDTH] IN BLOOD BY AUTOMATED COUNT: 12.9 % (ref 11.6–15.1)
GFR SERPL CREATININE-BSD FRML MDRD: 100 ML/MIN/1.73SQ M
GLUCOSE P FAST SERPL-MCNC: 86 MG/DL (ref 65–99)
HCT VFR BLD AUTO: 42.1 % (ref 34.8–46.1)
HDLC SERPL-MCNC: 42 MG/DL
HGB BLD-MCNC: 13.8 G/DL (ref 11.5–15.4)
IMM GRANULOCYTES # BLD AUTO: 0.01 THOUSAND/UL (ref 0–0.2)
IMM GRANULOCYTES NFR BLD AUTO: 0 % (ref 0–2)
LDLC SERPL CALC-MCNC: 101 MG/DL (ref 0–100)
LYMPHOCYTES # BLD AUTO: 1.55 THOUSANDS/ÂΜL (ref 0.6–4.47)
LYMPHOCYTES NFR BLD AUTO: 31 % (ref 14–44)
MCH RBC QN AUTO: 30.7 PG (ref 26.8–34.3)
MCHC RBC AUTO-ENTMCNC: 32.8 G/DL (ref 31.4–37.4)
MCV RBC AUTO: 94 FL (ref 82–98)
MONOCYTES # BLD AUTO: 0.49 THOUSAND/ÂΜL (ref 0.17–1.22)
MONOCYTES NFR BLD AUTO: 10 % (ref 4–12)
NEUTROPHILS # BLD AUTO: 2.86 THOUSANDS/ÂΜL (ref 1.85–7.62)
NEUTS SEG NFR BLD AUTO: 58 % (ref 43–75)
NRBC BLD AUTO-RTO: 0 /100 WBCS
PLATELET # BLD AUTO: 215 THOUSANDS/UL (ref 149–390)
PMV BLD AUTO: 11.7 FL (ref 8.9–12.7)
POTASSIUM SERPL-SCNC: 4.1 MMOL/L (ref 3.5–5.3)
PROT SERPL-MCNC: 7 G/DL (ref 6.4–8.4)
RBC # BLD AUTO: 4.5 MILLION/UL (ref 3.81–5.12)
SODIUM SERPL-SCNC: 139 MMOL/L (ref 135–147)
TRIGL SERPL-MCNC: 77 MG/DL
WBC # BLD AUTO: 4.99 THOUSAND/UL (ref 4.31–10.16)

## 2024-03-23 PROCEDURE — 80053 COMPREHEN METABOLIC PANEL: CPT

## 2024-03-23 PROCEDURE — 36415 COLL VENOUS BLD VENIPUNCTURE: CPT

## 2024-03-23 PROCEDURE — 85025 COMPLETE CBC W/AUTO DIFF WBC: CPT

## 2024-03-23 PROCEDURE — 80061 LIPID PANEL: CPT

## 2024-03-25 ENCOUNTER — TELEPHONE (OUTPATIENT)
Dept: INTERNAL MEDICINE CLINIC | Facility: CLINIC | Age: 54
End: 2024-03-25

## 2024-03-25 NOTE — TELEPHONE ENCOUNTER
----- Message from David Olmstead DO sent at 3/24/2024 11:03 PM EDT -----  Mild changes on labs and we will discuss at our next follow up

## 2024-05-23 ENCOUNTER — ANESTHESIA EVENT (OUTPATIENT)
Dept: GASTROENTEROLOGY | Facility: HOSPITAL | Age: 54
End: 2024-05-23

## 2024-05-23 ENCOUNTER — ANESTHESIA (OUTPATIENT)
Dept: GASTROENTEROLOGY | Facility: HOSPITAL | Age: 54
End: 2024-05-23

## 2024-05-23 ENCOUNTER — HOSPITAL ENCOUNTER (OUTPATIENT)
Dept: GASTROENTEROLOGY | Facility: HOSPITAL | Age: 54
Setting detail: OUTPATIENT SURGERY
End: 2024-05-23
Attending: INTERNAL MEDICINE
Payer: COMMERCIAL

## 2024-05-23 VITALS
OXYGEN SATURATION: 96 % | RESPIRATION RATE: 16 BRPM | SYSTOLIC BLOOD PRESSURE: 106 MMHG | HEART RATE: 61 BPM | DIASTOLIC BLOOD PRESSURE: 50 MMHG | TEMPERATURE: 97.5 F

## 2024-05-23 DIAGNOSIS — Z80.0 FAMILY HISTORY OF COLON CANCER: ICD-10-CM

## 2024-05-23 DIAGNOSIS — Z86.010 HISTORY OF COLON POLYPS: ICD-10-CM

## 2024-05-23 PROBLEM — Z98.890 PONV (POSTOPERATIVE NAUSEA AND VOMITING): Status: ACTIVE | Noted: 2024-05-23

## 2024-05-23 PROBLEM — R11.2 PONV (POSTOPERATIVE NAUSEA AND VOMITING): Status: ACTIVE | Noted: 2024-05-23

## 2024-05-23 LAB
EXT PREGNANCY TEST URINE: NEGATIVE
EXT. CONTROL: NORMAL

## 2024-05-23 PROCEDURE — 88305 TISSUE EXAM BY PATHOLOGIST: CPT | Performed by: PATHOLOGY

## 2024-05-23 PROCEDURE — 45380 COLONOSCOPY AND BIOPSY: CPT | Performed by: STUDENT IN AN ORGANIZED HEALTH CARE EDUCATION/TRAINING PROGRAM

## 2024-05-23 PROCEDURE — 81025 URINE PREGNANCY TEST: CPT | Performed by: STUDENT IN AN ORGANIZED HEALTH CARE EDUCATION/TRAINING PROGRAM

## 2024-05-23 RX ORDER — SODIUM CHLORIDE, SODIUM LACTATE, POTASSIUM CHLORIDE, CALCIUM CHLORIDE 600; 310; 30; 20 MG/100ML; MG/100ML; MG/100ML; MG/100ML
125 INJECTION, SOLUTION INTRAVENOUS CONTINUOUS
Status: DISCONTINUED | OUTPATIENT
Start: 2024-05-23 | End: 2024-05-27 | Stop reason: HOSPADM

## 2024-05-23 RX ORDER — LIDOCAINE HYDROCHLORIDE 10 MG/ML
INJECTION, SOLUTION EPIDURAL; INFILTRATION; INTRACAUDAL; PERINEURAL AS NEEDED
Status: DISCONTINUED | OUTPATIENT
Start: 2024-05-23 | End: 2024-05-23

## 2024-05-23 RX ORDER — PROPOFOL 10 MG/ML
INJECTION, EMULSION INTRAVENOUS AS NEEDED
Status: DISCONTINUED | OUTPATIENT
Start: 2024-05-23 | End: 2024-05-23

## 2024-05-23 RX ORDER — SODIUM CHLORIDE, SODIUM LACTATE, POTASSIUM CHLORIDE, CALCIUM CHLORIDE 600; 310; 30; 20 MG/100ML; MG/100ML; MG/100ML; MG/100ML
INJECTION, SOLUTION INTRAVENOUS CONTINUOUS PRN
Status: DISCONTINUED | OUTPATIENT
Start: 2024-05-23 | End: 2024-05-23

## 2024-05-23 RX ORDER — ONDANSETRON 2 MG/ML
INJECTION INTRAMUSCULAR; INTRAVENOUS AS NEEDED
Status: DISCONTINUED | OUTPATIENT
Start: 2024-05-23 | End: 2024-05-23

## 2024-05-23 RX ADMIN — PROPOFOL 50 MG: 10 INJECTION, EMULSION INTRAVENOUS at 10:37

## 2024-05-23 RX ADMIN — PROPOFOL 160 MCG/KG/MIN: 10 INJECTION, EMULSION INTRAVENOUS at 10:21

## 2024-05-23 RX ADMIN — PROPOFOL 150 MG: 10 INJECTION, EMULSION INTRAVENOUS at 10:17

## 2024-05-23 RX ADMIN — LIDOCAINE HYDROCHLORIDE 50 MG: 10 INJECTION, SOLUTION EPIDURAL; INFILTRATION; INTRACAUDAL; PERINEURAL at 10:17

## 2024-05-23 RX ADMIN — PROPOFOL 40 MG: 10 INJECTION, EMULSION INTRAVENOUS at 10:27

## 2024-05-23 RX ADMIN — PROPOFOL 50 MG: 10 INJECTION, EMULSION INTRAVENOUS at 10:20

## 2024-05-23 RX ADMIN — PROPOFOL 50 MG: 10 INJECTION, EMULSION INTRAVENOUS at 10:30

## 2024-05-23 RX ADMIN — PROPOFOL 50 MG: 10 INJECTION, EMULSION INTRAVENOUS at 10:18

## 2024-05-23 RX ADMIN — SODIUM CHLORIDE, SODIUM LACTATE, POTASSIUM CHLORIDE, AND CALCIUM CHLORIDE: .6; .31; .03; .02 INJECTION, SOLUTION INTRAVENOUS at 10:15

## 2024-05-23 RX ADMIN — ONDANSETRON 4 MG: 2 INJECTION INTRAMUSCULAR; INTRAVENOUS at 10:17

## 2024-05-23 NOTE — ANESTHESIA POSTPROCEDURE EVALUATION
Post-Op Assessment Note    CV Status:  Stable    Pain management: satisfactory to patient       Mental Status:  Alert and awake   Hydration Status:  Euvolemic   PONV Controlled:  Controlled   Airway Patency:  Patent     Post Op Vitals Reviewed: Yes    No anethesia notable event occurred.    Staff: CRNA               BP   83/45 (patient awake, IVF open)   Temp   98.2   Pulse  70   Resp   16   SpO2   100

## 2024-05-23 NOTE — H&P
History and Physical -  [facility]  Terri Esteban 54 y.o. female MRN: [id number]                  HPI: Terri Esteban is a 54 y.o. year old female who presents for history of colon cancer. Patient's mother had late onset colon cancer.      REVIEW OF SYSTEMS: Per the HPI, and otherwise unremarkable.    Historical Information   Past Medical History:   Diagnosis Date    Colon polyp      Past Surgical History:   Procedure Laterality Date    ARTHRODESIS      Spinal    DENTAL SURGERY      INGUINAL HERNIA REPAIR      SPINE SURGERY  2001 & 2013    WRIST SURGERY       Social History   Social History     Substance and Sexual Activity   Alcohol Use Not Currently    Comment: social     Social History     Substance and Sexual Activity   Drug Use Never     Social History     Tobacco Use   Smoking Status Never   Smokeless Tobacco Never     Family History   Problem Relation Age of Onset    Diabetes Mother     Hypertension Mother     Colon cancer Mother     Breast cancer Mother     Heart disease Father         cardiac disorder    Stroke Father     Breast cancer Maternal Aunt     Breast cancer additional onset Maternal Aunt     Cancer Cousin        Meds/Allergies       Current Outpatient Medications:     calcium citrate-vitamin D (CITRACAL+D) 315-200 MG-UNIT per tablet    Multiple Vitamin tablet    Current Facility-Administered Medications:     lactated ringers infusion, 125 mL/hr, Intravenous, Continuous    No Known Allergies    Objective     /60   Pulse 91   Temp (!) 97.3 °F (36.3 °C) (Temporal)   Resp 18   LMP 05/21/2024   SpO2 99%       PHYSICAL EXAM    Gen: NAD  Head: NCAT  CV: RRR  CHEST: Clear  ABD: soft, NT/ND  EXT: no edema      ASSESSMENT/PLAN:  This is a 54 y.o. year old female here for colonoscopy, and she is stable and optimized for her procedure.

## 2024-05-23 NOTE — ANESTHESIA PREPROCEDURE EVALUATION
Procedure:  COLONOSCOPY    Relevant Problems   ANESTHESIA   (+) PONV (postoperative nausea and vomiting) (With spinal surgeries. Tolerated colonoscopy)      CARDIO (within normal limits)      ENDO (within normal limits)      GI/HEPATIC (within normal limits)      GYN (within normal limits)      MUSCULOSKELETAL   (+) Chronic bilateral low back pain without sciatica      NEURO/PSYCH   (+) Chronic bilateral low back pain without sciatica      Neurology/Sleep   (+) History of hemilaminectomy        Physical Exam    Airway    Mallampati score: II  TM Distance: >3 FB  Neck ROM: full     Dental   No notable dental hx     Cardiovascular  Cardiovascular exam normal    Pulmonary  Pulmonary exam normal     Other Findings  post-pubertal.      Anesthesia Plan  ASA Score- 1     Anesthesia Type- IV sedation with anesthesia with ASA Monitors.         Additional Monitors:     Airway Plan:            Plan Factors-Exercise tolerance (METS): >4 METS.    Chart reviewed. EKG reviewed. Imaging results reviewed. Existing labs reviewed. Patient summary reviewed.    Patient is not a current smoker.      There is medical exclusion for perioperative obstructive sleep apnea risk education.        Induction- intravenous.    Postoperative Plan-         Informed Consent- Anesthetic plan and risks discussed with patient.  I personally reviewed this patient with the CRNA. Discussed and agreed on the Anesthesia Plan with the CRNA..

## 2024-05-28 PROCEDURE — 88305 TISSUE EXAM BY PATHOLOGIST: CPT | Performed by: PATHOLOGY

## 2024-09-06 ENCOUNTER — OFFICE VISIT (OUTPATIENT)
Dept: URGENT CARE | Facility: CLINIC | Age: 54
End: 2024-09-06
Payer: COMMERCIAL

## 2024-09-06 VITALS
RESPIRATION RATE: 18 BRPM | DIASTOLIC BLOOD PRESSURE: 62 MMHG | TEMPERATURE: 97.5 F | OXYGEN SATURATION: 100 % | SYSTOLIC BLOOD PRESSURE: 128 MMHG | HEART RATE: 65 BPM

## 2024-09-06 DIAGNOSIS — H00.025 HORDEOLUM INTERNUM OF LEFT LOWER EYELID: Primary | ICD-10-CM

## 2024-09-06 PROCEDURE — S9083 URGENT CARE CENTER GLOBAL: HCPCS | Performed by: NURSE PRACTITIONER

## 2024-09-06 PROCEDURE — G0382 LEV 3 HOSP TYPE B ED VISIT: HCPCS | Performed by: NURSE PRACTITIONER

## 2024-09-06 RX ORDER — OFLOXACIN 3 MG/ML
1 SOLUTION/ DROPS OPHTHALMIC 4 TIMES DAILY
Qty: 5 ML | Refills: 0 | Status: SHIPPED | OUTPATIENT
Start: 2024-09-06

## 2024-09-06 RX ORDER — ERYTHROMYCIN 5 MG/G
0.5 OINTMENT OPHTHALMIC
Qty: 3.5 G | Refills: 0 | Status: SHIPPED | OUTPATIENT
Start: 2024-09-06

## 2024-09-06 NOTE — PROGRESS NOTES
[facility]        NAME: Terri Esteban is a 54 y.o. female  : 1970    MRN: [id number]  DATE: 2024  TIME: 4:07 PM    Assessment and Plan   Hordeolum internum of left lower eyelid [H00.025]  1. Hordeolum internum of left lower eyelid  erythromycin (ILOTYCIN) ophthalmic ointment    ofloxacin (OCUFLOX) 0.3 % ophthalmic solution        Continue warm compresses with massage. As these have been recurrent since July advised oflaxacin during the day and erythrmycin ointment HS.     Patient Instructions       Follow up with PCP in 3-5 days.  Proceed to  ER if symptoms worsen.    If tests are performed, our office will contact you with results only if changes need to made to the care plan discussed with you at the visit. [facility]    Chief Complaint     Chief Complaint   Patient presents with    Eye Problem     Left eye stye started a few weeks ago          History of Present Illness       Possible stye since July. Had a bump on the left upper eyelid. Used warm compresses and stopped using her contacts. Started to get better but then one started on the lower lid. Mildly tender. Saw regular eye doctor for annual check up and states he did not check for the stye although she asked him to.         Review of Systems   Review of Systems   Constitutional:  Negative for chills, fatigue and unexpected weight change.   HENT:  Negative for congestion and ear pain.    Eyes:  Negative for photophobia, pain, discharge, redness, itching and visual disturbance.        Stye left lower lid   Skin:  Negative for color change, rash and wound.   Neurological:  Negative for dizziness and headaches.         Current Medications       Current Outpatient Medications:     erythromycin (ILOTYCIN) ophthalmic ointment, Administer 0.5 inches into the left eye daily at bedtime, Disp: 3.5 g, Rfl: 0    ofloxacin (OCUFLOX) 0.3 % ophthalmic solution, Administer 1 drop into the left  eye 4 (four) times a day, Disp: 5 mL, Rfl: 0    calcium citrate-vitamin D (CITRACAL+D) 315-200 MG-UNIT per tablet, Take 1 tablet by mouth daily, Disp: , Rfl:     Multiple Vitamin tablet, Take by mouth daily, Disp: , Rfl:     Current Allergies     Allergies as of 09/06/2024    (No Known Allergies)            The following portions of the patient's history were reviewed and updated as appropriate: allergies, current medications, past family history, past medical history, past social history, past surgical history and problem list.     Past Medical History:   Diagnosis Date    Colon polyp        Past Surgical History:   Procedure Laterality Date    ARTHRODESIS      Spinal    DENTAL SURGERY      INGUINAL HERNIA REPAIR      SPINE SURGERY  2001 & 2013    WRIST SURGERY         Family History   Problem Relation Age of Onset    Diabetes Mother     Hypertension Mother     Colon cancer Mother     Breast cancer Mother     Heart disease Father         cardiac disorder    Stroke Father     Breast cancer Maternal Aunt     Breast cancer additional onset Maternal Aunt     Cancer Cousin          Medications have been verified.        Objective   /62   Pulse 65   Temp 97.5 °F (36.4 °C)   Resp 18   SpO2 100%        Physical Exam     Physical Exam  Vitals and nursing note reviewed.   Constitutional:       General: She is not in acute distress.     Appearance: Normal appearance. She is not ill-appearing.   HENT:      Head: Normocephalic and atraumatic.   Eyes:      General:         Left eye: Hordeolum present.No foreign body or discharge.      Extraocular Movements:      Left eye: No nystagmus.      Conjunctiva/sclera:      Left eye: Left conjunctiva is not injected. No chemosis, exudate or hemorrhage.    Cardiovascular:      Rate and Rhythm: Normal rate and regular rhythm.      Heart sounds: Normal heart sounds, S1 normal and S2 normal.   Pulmonary:      Effort: Pulmonary effort is normal. No accessory muscle usage.       Breath sounds: Normal breath sounds. No wheezing.   Skin:     General: Skin is warm and dry.      Capillary Refill: Capillary refill takes less than 2 seconds.      Findings: No rash.   Neurological:      General: No focal deficit present.      Mental Status: She is alert and oriented to person, place, and time.      Motor: Motor function is intact.   Psychiatric:         Attention and Perception: Attention and perception normal.         Mood and Affect: Mood and affect normal.         Speech: Speech normal.         Behavior: Behavior normal. Behavior is cooperative.         Thought Content: Thought content normal.

## 2024-12-11 ENCOUNTER — OFFICE VISIT (OUTPATIENT)
Dept: INTERNAL MEDICINE CLINIC | Facility: CLINIC | Age: 54
End: 2024-12-11
Payer: COMMERCIAL

## 2024-12-11 VITALS
OXYGEN SATURATION: 99 % | DIASTOLIC BLOOD PRESSURE: 70 MMHG | HEART RATE: 74 BPM | TEMPERATURE: 97.2 F | BODY MASS INDEX: 21.89 KG/M2 | WEIGHT: 147.8 LBS | SYSTOLIC BLOOD PRESSURE: 110 MMHG | HEIGHT: 69 IN

## 2024-12-11 DIAGNOSIS — Z13.1 SCREENING FOR DIABETES MELLITUS: ICD-10-CM

## 2024-12-11 DIAGNOSIS — Z12.31 ENCOUNTER FOR SCREENING MAMMOGRAM FOR BREAST CANCER: ICD-10-CM

## 2024-12-11 DIAGNOSIS — Z13.29 SCREENING FOR THYROID DISORDER: ICD-10-CM

## 2024-12-11 DIAGNOSIS — Z11.4 SCREENING FOR HIV (HUMAN IMMUNODEFICIENCY VIRUS): ICD-10-CM

## 2024-12-11 DIAGNOSIS — Z13.0 SCREENING FOR DEFICIENCY ANEMIA: ICD-10-CM

## 2024-12-11 DIAGNOSIS — Z13.220 SCREENING, LIPID: ICD-10-CM

## 2024-12-11 DIAGNOSIS — Z11.59 NEED FOR HEPATITIS C SCREENING TEST: ICD-10-CM

## 2024-12-11 DIAGNOSIS — Z23 ENCOUNTER FOR IMMUNIZATION: ICD-10-CM

## 2024-12-11 DIAGNOSIS — Z00.00 WELL ADULT EXAM: Primary | ICD-10-CM

## 2024-12-11 DIAGNOSIS — E55.9 VITAMIN D DEFICIENCY: ICD-10-CM

## 2024-12-11 PROCEDURE — 99396 PREV VISIT EST AGE 40-64: CPT | Performed by: PHYSICIAN ASSISTANT

## 2024-12-11 NOTE — PROGRESS NOTES
Adult Annual Physical  Name: Terri Esteban      : 1970      MRN: [id number]  Encounter Provider: Ledy Laws PA-C  Encounter Date: 2024   Encounter department: [facility]    Assessment & Plan  Need for hepatitis C screening test    Orders:  •  Hepatitis C Antibody; Future    Screening for HIV (human immunodeficiency virus)    Orders:  •  HIV 1/2 AG/AB w Reflex [facility] for 2 yr old and above; Future    Encounter for screening mammogram for breast cancer         Encounter for immunization    Orders:  •  Zoster Vaccine Recombinant IM    Screening for thyroid disorder    Orders:  •  TSH, 3rd generation; Future    Screening for diabetes mellitus    Orders:  •  Comprehensive metabolic panel; Future    Screening for deficiency anemia    Orders:  •  CBC and differential; Future    Vitamin D deficiency    Orders:  •  Vitamin D 25 hydroxy; Future    Screening, lipid    Orders:  •  Lipid panel; Future    Well adult exam         Immunizations and preventive care screenings were discussed with patient today. Appropriate education was printed on patient's after visit summary.    Counseling:  Labs ordered. Mammogram scheduled, CRC screening current.       Depression Screening and Follow-up Plan: Patient was screened for depression during today's encounter. They screened negative with a PHQ-2 score of 0.        History of Present Illness     Adult Annual Physical:  Patient presents for annual physical.     Diet and Physical Activity:  - Diet/Nutrition: well balanced diet.  - Exercise: walking.    Depression Screening:  - PHQ-2 Score: 0    General Health:  - Sleep: sleeps well.  - Hearing: normal hearing bilateral ears.  - Vision: goes for regular eye exams.  - Dental: regular dental visits.    /GYN Health:  - Follows with GYN: yes.   - Menopause: perimenopausal.   - History of STDs: no    Advanced Care Planning:  - Has an advanced directive?: no    - Has a durable medical POA?: no    - ACP  "document given to patient?: no      Review of Systems   Constitutional:  Negative for chills and fever.   HENT:  Negative for congestion, ear pain, hearing loss, postnasal drip, rhinorrhea, sinus pressure, sinus pain, sore throat and trouble swallowing.    Eyes:  Negative for pain and visual disturbance.   Respiratory:  Negative for cough, chest tightness, shortness of breath and wheezing.    Cardiovascular: Negative.  Negative for chest pain, palpitations and leg swelling.   Gastrointestinal:  Negative for abdominal pain, blood in stool, constipation, diarrhea, nausea and vomiting.   Endocrine: Negative for cold intolerance, heat intolerance, polydipsia, polyphagia and polyuria.   Genitourinary:  Negative for difficulty urinating, dysuria, flank pain and urgency.   Musculoskeletal:  Negative for arthralgias, back pain, gait problem and myalgias.   Skin:  Negative for rash.   Allergic/Immunologic: Negative.    Neurological:  Negative for dizziness, weakness, light-headedness and headaches.   Hematological: Negative.    Psychiatric/Behavioral:  Negative for behavioral problems, dysphoric mood and sleep disturbance. The patient is not nervous/anxious.          Objective   /70   Pulse 74   Temp (!) 97.2 °F (36.2 °C) (Tympanic)   Ht 5' 9\" (1.753 m)   Wt 67 kg (147 lb 12.8 oz)   LMP 11/04/2024 (Approximate)   SpO2 99%   BMI 21.83 kg/m²     Physical Exam  Vitals and nursing note reviewed.   Constitutional:       Appearance: She is well-developed.   HENT:      Head: Normocephalic and atraumatic.      Right Ear: External ear normal.      Left Ear: External ear normal.      Nose: Nose normal.   Eyes:      Conjunctiva/sclera: Conjunctivae normal.   Cardiovascular:      Rate and Rhythm: Normal rate and regular rhythm.      Heart sounds: Normal heart sounds.   Pulmonary:      Effort: Pulmonary effort is normal.      Breath sounds: Normal breath sounds.   Abdominal:      General: Bowel sounds are normal.      " Palpations: Abdomen is soft.   Musculoskeletal:         General: Normal range of motion.      Cervical back: Normal range of motion and neck supple.   Skin:     General: Skin is warm and dry.   Neurological:      Mental Status: She is alert and oriented to person, place, and time.   Psychiatric:         Behavior: Behavior normal.         Thought Content: Thought content normal.         Judgment: Judgment normal.       Administrative Statements   I have spent a total time of 15 minutes in caring for this patient on the day of the visit/encounter including Patient and family education and Importance of tx compliance.

## 2024-12-14 ENCOUNTER — APPOINTMENT (OUTPATIENT)
Dept: LAB | Facility: CLINIC | Age: 54
End: 2024-12-14
Payer: COMMERCIAL

## 2024-12-14 DIAGNOSIS — E55.9 VITAMIN D DEFICIENCY: ICD-10-CM

## 2024-12-14 DIAGNOSIS — Z13.220 SCREENING, LIPID: ICD-10-CM

## 2024-12-14 LAB
25(OH)D3 SERPL-MCNC: 45 NG/ML (ref 30–100)
CHOLEST SERPL-MCNC: 167 MG/DL (ref ?–200)
HDLC SERPL-MCNC: 55 MG/DL
LDLC SERPL CALC-MCNC: 96 MG/DL (ref 0–100)
NONHDLC SERPL-MCNC: 112 MG/DL
TRIGL SERPL-MCNC: 80 MG/DL (ref ?–150)

## 2024-12-14 PROCEDURE — 80061 LIPID PANEL: CPT

## 2024-12-14 PROCEDURE — 82306 VITAMIN D 25 HYDROXY: CPT

## 2024-12-14 PROCEDURE — 36415 COLL VENOUS BLD VENIPUNCTURE: CPT

## 2024-12-30 ENCOUNTER — RESULTS FOLLOW-UP (OUTPATIENT)
Dept: INTERNAL MEDICINE CLINIC | Facility: CLINIC | Age: 54
End: 2024-12-30

## 2024-12-30 NOTE — RESULT ENCOUNTER NOTE
Sergio Murray  I received and reviewed your recent labs and everything looked good. Please reach out with any specific questions or concerns. Have a great day  -Ledy

## 2025-05-25 ENCOUNTER — OFFICE VISIT (OUTPATIENT)
Dept: URGENT CARE | Facility: CLINIC | Age: 55
End: 2025-05-25
Payer: COMMERCIAL

## 2025-05-25 VITALS
TEMPERATURE: 97.9 F | OXYGEN SATURATION: 99 % | RESPIRATION RATE: 18 BRPM | SYSTOLIC BLOOD PRESSURE: 120 MMHG | DIASTOLIC BLOOD PRESSURE: 80 MMHG | HEART RATE: 60 BPM

## 2025-05-25 DIAGNOSIS — H60.501 ACUTE OTITIS EXTERNA OF RIGHT EAR, UNSPECIFIED TYPE: Primary | ICD-10-CM

## 2025-05-25 PROCEDURE — S9083 URGENT CARE CENTER GLOBAL: HCPCS | Performed by: PHYSICIAN ASSISTANT

## 2025-05-25 PROCEDURE — G0382 LEV 3 HOSP TYPE B ED VISIT: HCPCS | Performed by: PHYSICIAN ASSISTANT

## 2025-05-25 RX ORDER — CEPHALEXIN 500 MG/1
500 CAPSULE ORAL EVERY 8 HOURS SCHEDULED
Qty: 21 CAPSULE | Refills: 0 | Status: SHIPPED | OUTPATIENT
Start: 2025-05-25 | End: 2025-06-01

## 2025-05-25 NOTE — PROGRESS NOTES
[facility]  Name: Terri Esteban      : 1970      MRN: [id number]  Encounter Provider: Michelle Behler, PA-C  Encounter Date: 2025   Encounter department: [facility] [place]  :  Assessment & Plan  Acute otitis externa of right ear, unspecified type    Orders:    cephalexin (KEFLEX) 500 mg capsule; Take 1 capsule (500 mg total) by mouth every 8 (eight) hours for 7 days    Ambulatory Referral to Otolaryngology; Future        Patient Instructions    Patient Instructions   Keflex as directed.   Follow up with ENT if not improving.    Follow up with PCP in 3-5 days.  Proceed to ER if symptoms worsen.    If tests were performed at your visit today, our office will contact you only if changes need to made to the care plan discussed with you at the visit. [facility]    Chief Complaint:   Chief Complaint   Patient presents with    Earache     Right ear pain 7 days      History of Present Illness   55-year-old female here with complaint of right earache over the last week.  States it is a sharp pain in her ear.  Does not hurt to touch it.  Denies any cough or cold symptoms.  Also has a soreness and tightness in her neck behind her ear.  Denies any fever or chills.          Review of Systems   Constitutional:  Negative for appetite change, chills and fever.   HENT:  Positive for ear pain. Negative for congestion, ear discharge, facial swelling, postnasal drip, sinus pressure, sneezing and sore throat.    Respiratory:  Negative for cough, shortness of breath and wheezing.    Neurological:  Negative for headaches.     Past Medical History   Past Medical History[1]  Past Surgical History[2]  Family History[3]  she reports that she has never smoked. She has never used smokeless tobacco. She reports that she does not currently use alcohol. She reports that she does not use drugs.  Current Outpatient Medications   Medication Instructions    calcium  "citrate-vitamin D (CITRACAL+D) 315-200 MG-UNIT per tablet 1 tablet, Daily    cephalexin (KEFLEX) 500 mg, Oral, Every 8 hours scheduled    erythromycin (ILOTYCIN) ophthalmic ointment 0.5 inches, Left Eye, Daily at bedtime    Multiple Vitamin tablet Daily    ofloxacin (OCUFLOX) 0.3 % ophthalmic solution 1 drop, Left Eye, 4 times daily   Allergies[4]     Objective   /80   Pulse 60   Temp 97.9 °F (36.6 °C)   Resp 18   SpO2 99%      Physical Exam  Vitals and nursing note reviewed.   Constitutional:       General: She is not in acute distress.     Appearance: Normal appearance.   HENT:      Head: Normocephalic and atraumatic.      Right Ear: Tympanic membrane normal. Swelling (irritaiton and mild erythema of canal) present.      Left Ear: Tympanic membrane normal.      Nose: Nose normal. No congestion.      Mouth/Throat:      Mouth: Mucous membranes are moist.      Pharynx: No posterior oropharyngeal erythema.     Eyes:      General:         Right eye: No discharge.         Left eye: No discharge.       Cardiovascular:      Rate and Rhythm: Normal rate and regular rhythm.      Pulses: Normal pulses.      Heart sounds: No murmur heard.  Pulmonary:      Effort: Pulmonary effort is normal. No respiratory distress.      Breath sounds: Normal breath sounds.     Musculoskeletal:      Cervical back: Normal range of motion.     Neurological:      Mental Status: She is alert.         Portions of the record may have been created with voice recognition software.  Occasional wrong word or \"sound a like\" substitutions may have occurred due to the inherent limitations of voice recognition software.  Read the chart carefully and recognize, using context, where substitutions have occurred.       [1]   Past Medical History:  Diagnosis Date    Colon polyp    [2]   Past Surgical History:  Procedure Laterality Date    ARTHRODESIS      Spinal    DENTAL SURGERY      INGUINAL HERNIA REPAIR      SPINE SURGERY  2001 & 2013    WRIST " SURGERY     [3]   Family History  Problem Relation Name Age of Onset    Diabetes Mother Doris     Hypertension Mother Doris     Colon cancer Mother Doris     Breast cancer Mother Doris     Heart disease Father Garrett         cardiac disorder    Stroke Father Garrett     Breast cancer Maternal Aunt Zahra     Breast cancer additional onset Maternal Aunt Zahra     Cancer Cousin Sushma    [4] No Known Allergies